# Patient Record
Sex: MALE | URBAN - METROPOLITAN AREA
[De-identification: names, ages, dates, MRNs, and addresses within clinical notes are randomized per-mention and may not be internally consistent; named-entity substitution may affect disease eponyms.]

---

## 2017-10-09 ENCOUNTER — NEW REFERRAL (OUTPATIENT)
Dept: URBAN - METROPOLITAN AREA CLINIC 35 | Facility: CLINIC | Age: 57
End: 2017-10-09

## 2017-10-09 DIAGNOSIS — H35.3132: ICD-10-CM

## 2017-10-09 DIAGNOSIS — H44.23: ICD-10-CM

## 2017-10-09 DIAGNOSIS — H43.393: ICD-10-CM

## 2017-10-09 PROCEDURE — 92225 OPHTHALMOSCOPY (INITIAL): CPT

## 2017-10-09 PROCEDURE — 92134 CPTRZ OPH DX IMG PST SGM RTA: CPT

## 2017-10-09 PROCEDURE — 92250 FUNDUS PHOTOGRAPHY W/I&R: CPT

## 2017-10-09 PROCEDURE — 99244 OFF/OP CNSLTJ NEW/EST MOD 40: CPT

## 2017-10-09 ASSESSMENT — TONOMETRY
OD_IOP_MMHG: 20
OS_IOP_MMHG: 19

## 2017-10-09 ASSESSMENT — VISUAL ACUITY
OS_CC: 20/20-2
OD_PH: 20/25
OD_CC: 20/40

## 2024-09-09 ENCOUNTER — TELEPHONE (OUTPATIENT)
Age: 64
End: 2024-09-09

## 2024-09-09 ENCOUNTER — HOSPITAL ENCOUNTER (OUTPATIENT)
Facility: HOSPITAL | Age: 64
Setting detail: OBSERVATION
Discharge: HOME | End: 2024-09-10
Attending: EMERGENCY MEDICINE | Admitting: INTERNAL MEDICINE
Payer: COMMERCIAL

## 2024-09-09 ENCOUNTER — APPOINTMENT (EMERGENCY)
Dept: CT IMAGING | Facility: HOSPITAL | Age: 64
End: 2024-09-09
Payer: COMMERCIAL

## 2024-09-09 ENCOUNTER — APPOINTMENT (EMERGENCY)
Dept: RADIOLOGY | Facility: HOSPITAL | Age: 64
End: 2024-09-09
Payer: COMMERCIAL

## 2024-09-09 DIAGNOSIS — R56.9 SEIZURE-LIKE ACTIVITY (HCC): ICD-10-CM

## 2024-09-09 DIAGNOSIS — R56.9 SEIZURE (HCC): ICD-10-CM

## 2024-09-09 DIAGNOSIS — R40.4 TRANSIENT ALTERATION OF AWARENESS: Primary | ICD-10-CM

## 2024-09-09 PROBLEM — Z95.0 PRESENCE OF CARDIAC PACEMAKER: Status: ACTIVE | Noted: 2024-08-21

## 2024-09-09 PROBLEM — G47.33 OBSTRUCTIVE SLEEP APNEA SYNDROME: Status: ACTIVE | Noted: 2024-08-08

## 2024-09-09 PROBLEM — I77.810 AORTIC ROOT DILATION (HCC): Status: ACTIVE | Noted: 2024-08-21

## 2024-09-09 PROBLEM — I45.5 SINUS PAUSE: Status: ACTIVE | Noted: 2024-06-25

## 2024-09-09 LAB
2HR DELTA HS TROPONIN: 0 NG/L
ALBUMIN SERPL BCG-MCNC: 3.8 G/DL (ref 3.5–5)
ALP SERPL-CCNC: 53 U/L (ref 34–104)
ALT SERPL W P-5'-P-CCNC: 46 U/L (ref 7–52)
ANION GAP SERPL CALCULATED.3IONS-SCNC: 6 MMOL/L (ref 4–13)
AST SERPL W P-5'-P-CCNC: 27 U/L (ref 13–39)
ATRIAL RATE: 81 BPM
BASOPHILS # BLD AUTO: 0.03 THOUSANDS/ÂΜL (ref 0–0.1)
BASOPHILS NFR BLD AUTO: 1 % (ref 0–1)
BILIRUB SERPL-MCNC: 0.96 MG/DL (ref 0.2–1)
BUN SERPL-MCNC: 13 MG/DL (ref 5–25)
CALCIUM SERPL-MCNC: 8.4 MG/DL (ref 8.4–10.2)
CARDIAC TROPONIN I PNL SERPL HS: 11 NG/L
CARDIAC TROPONIN I PNL SERPL HS: 11 NG/L
CHLORIDE SERPL-SCNC: 108 MMOL/L (ref 96–108)
CO2 SERPL-SCNC: 24 MMOL/L (ref 21–32)
CREAT SERPL-MCNC: 0.8 MG/DL (ref 0.6–1.3)
EOSINOPHIL # BLD AUTO: 0.15 THOUSAND/ÂΜL (ref 0–0.61)
EOSINOPHIL NFR BLD AUTO: 2 % (ref 0–6)
ERYTHROCYTE [DISTWIDTH] IN BLOOD BY AUTOMATED COUNT: 13.2 % (ref 11.6–15.1)
GFR SERPL CREATININE-BSD FRML MDRD: 94 ML/MIN/1.73SQ M
GLUCOSE SERPL-MCNC: 97 MG/DL (ref 65–140)
HCT VFR BLD AUTO: 39.9 % (ref 36.5–49.3)
HGB BLD-MCNC: 13.1 G/DL (ref 12–17)
IMM GRANULOCYTES # BLD AUTO: 0.05 THOUSAND/UL (ref 0–0.2)
IMM GRANULOCYTES NFR BLD AUTO: 1 % (ref 0–2)
LACTATE SERPL-SCNC: 0.8 MMOL/L (ref 0.5–2)
LYMPHOCYTES # BLD AUTO: 0.73 THOUSANDS/ÂΜL (ref 0.6–4.47)
LYMPHOCYTES NFR BLD AUTO: 11 % (ref 14–44)
MCH RBC QN AUTO: 30.7 PG (ref 26.8–34.3)
MCHC RBC AUTO-ENTMCNC: 32.8 G/DL (ref 31.4–37.4)
MCV RBC AUTO: 93 FL (ref 82–98)
MONOCYTES # BLD AUTO: 0.66 THOUSAND/ÂΜL (ref 0.17–1.22)
MONOCYTES NFR BLD AUTO: 10 % (ref 4–12)
NEUTROPHILS # BLD AUTO: 4.98 THOUSANDS/ÂΜL (ref 1.85–7.62)
NEUTS SEG NFR BLD AUTO: 75 % (ref 43–75)
NRBC BLD AUTO-RTO: 0 /100 WBCS
P AXIS: 40 DEGREES
PLATELET # BLD AUTO: 270 THOUSANDS/UL (ref 149–390)
PMV BLD AUTO: 10.7 FL (ref 8.9–12.7)
POTASSIUM SERPL-SCNC: 3.7 MMOL/L (ref 3.5–5.3)
PR INTERVAL: 172 MS
PROT SERPL-MCNC: 6.1 G/DL (ref 6.4–8.4)
QRS AXIS: -11 DEGREES
QRSD INTERVAL: 94 MS
QT INTERVAL: 396 MS
QTC INTERVAL: 460 MS
RBC # BLD AUTO: 4.27 MILLION/UL (ref 3.88–5.62)
SODIUM SERPL-SCNC: 138 MMOL/L (ref 135–147)
T WAVE AXIS: 23 DEGREES
VENTRICULAR RATE: 81 BPM
WBC # BLD AUTO: 6.6 THOUSAND/UL (ref 4.31–10.16)

## 2024-09-09 PROCEDURE — 99285 EMERGENCY DEPT VISIT HI MDM: CPT | Performed by: EMERGENCY MEDICINE

## 2024-09-09 PROCEDURE — 93005 ELECTROCARDIOGRAM TRACING: CPT

## 2024-09-09 PROCEDURE — 99223 1ST HOSP IP/OBS HIGH 75: CPT | Performed by: INTERNAL MEDICINE

## 2024-09-09 PROCEDURE — 84484 ASSAY OF TROPONIN QUANT: CPT | Performed by: EMERGENCY MEDICINE

## 2024-09-09 PROCEDURE — 99215 OFFICE O/P EST HI 40 MIN: CPT | Performed by: PSYCHIATRY & NEUROLOGY

## 2024-09-09 PROCEDURE — 83605 ASSAY OF LACTIC ACID: CPT | Performed by: EMERGENCY MEDICINE

## 2024-09-09 PROCEDURE — 36415 COLL VENOUS BLD VENIPUNCTURE: CPT | Performed by: EMERGENCY MEDICINE

## 2024-09-09 PROCEDURE — 71045 X-RAY EXAM CHEST 1 VIEW: CPT

## 2024-09-09 PROCEDURE — 85025 COMPLETE CBC W/AUTO DIFF WBC: CPT | Performed by: EMERGENCY MEDICINE

## 2024-09-09 PROCEDURE — 96374 THER/PROPH/DIAG INJ IV PUSH: CPT

## 2024-09-09 PROCEDURE — 99285 EMERGENCY DEPT VISIT HI MDM: CPT

## 2024-09-09 PROCEDURE — 70450 CT HEAD/BRAIN W/O DYE: CPT

## 2024-09-09 PROCEDURE — 93010 ELECTROCARDIOGRAM REPORT: CPT | Performed by: INTERNAL MEDICINE

## 2024-09-09 PROCEDURE — 80053 COMPREHEN METABOLIC PANEL: CPT | Performed by: EMERGENCY MEDICINE

## 2024-09-09 RX ORDER — OMEPRAZOLE 40 MG/1
40 CAPSULE, DELAYED RELEASE ORAL DAILY
COMMUNITY
Start: 2024-06-06

## 2024-09-09 RX ORDER — PANTOPRAZOLE SODIUM 40 MG/1
40 TABLET, DELAYED RELEASE ORAL
Status: DISCONTINUED | OUTPATIENT
Start: 2024-09-09 | End: 2024-09-10 | Stop reason: HOSPADM

## 2024-09-09 RX ORDER — LORATADINE 10 MG/1
10 TABLET ORAL DAILY
COMMUNITY
Start: 2024-08-29 | End: 2025-08-29

## 2024-09-09 RX ORDER — MORPHINE SULFATE 4 MG/ML
4 INJECTION, SOLUTION INTRAMUSCULAR; INTRAVENOUS ONCE
Status: COMPLETED | OUTPATIENT
Start: 2024-09-09 | End: 2024-09-09

## 2024-09-09 RX ORDER — ACETAMINOPHEN 325 MG/1
650 TABLET ORAL EVERY 6 HOURS PRN
Status: DISCONTINUED | OUTPATIENT
Start: 2024-09-09 | End: 2024-09-10 | Stop reason: HOSPADM

## 2024-09-09 RX ORDER — LEVETIRACETAM 500 MG/5ML
2500 INJECTION, SOLUTION, CONCENTRATE INTRAVENOUS ONCE
Status: COMPLETED | OUTPATIENT
Start: 2024-09-09 | End: 2024-09-09

## 2024-09-09 RX ORDER — LORATADINE 10 MG/1
10 TABLET ORAL DAILY
Status: DISCONTINUED | OUTPATIENT
Start: 2024-09-09 | End: 2024-09-10 | Stop reason: HOSPADM

## 2024-09-09 RX ADMIN — PANTOPRAZOLE SODIUM 40 MG: 40 TABLET, DELAYED RELEASE ORAL at 12:28

## 2024-09-09 RX ADMIN — LEVETIRACETAM 2500 MG: 100 INJECTION, SOLUTION INTRAVENOUS at 11:02

## 2024-09-09 RX ADMIN — LORATADINE 10 MG: 10 TABLET ORAL at 12:28

## 2024-09-09 RX ADMIN — LEVETIRACETAM 750 MG: 250 TABLET, FILM COATED ORAL at 22:29

## 2024-09-09 RX ADMIN — MORPHINE SULFATE 4 MG: 4 INJECTION, SOLUTION INTRAMUSCULAR; INTRAVENOUS at 05:20

## 2024-09-09 NOTE — ED PROVIDER NOTES
History  Chief Complaint   Patient presents with    Seizure - Prior Hx Of     Ems states that pt has been at [facility] twice for this seizure like activity. Pt and family wanted to go to a different hospital for a second opinion. Pt was suppose to follow-up with a neurologist but hasn't yet. Pt states that he went to bed last night and wife woke up and felt like pt wasn't breathing. EMS stated that pt was postictal from possible seizure like activity. Pt states that he had his first one 7weeks ago, 1 week ago and not this morning      Zain Amador is a 64 y.o. year old male with PMH of severe FRANCES, syncope, sinus arrest s/p PPM placement presenting to the [facility] ED for seizure-like activity. Patient reported to be in normal state of health yesterday prior to going to bed. He states he got up to go to the bathroom and then return to bed and replaced his CPAP device. Patient then regained consciousness with multiple people around him. Per wife the patient was gasping for air at 0245 this morning. Wife removed th cpap mask and noted patient was drooling and staring straight ahead. At that time the patient was unresponsive and she called EMS. Wife was instructed by 911 dispatch to perform CPR which she did until EMS arrived. At time of EMS arrival patient regained consciousness. There was no reported bowel/bladder incontinence or tongue laceration. Wife did not observe shaking or seizure-like activity. POC glucose 98 mg/dl per EMS. Patient alert/oriented and acting appropriately per EMS during transport to ED. He is denying any complaints at time of evaluation in ED.    Patient has history of prior episodes of unresponsiveness for which she was hospitalized at [facility].  Patient was found to have sinus pauses and had a Medtronic pacemaker placed on 6/27/2024.  The patient had another episode of unresponsiveness and sleep for which he was again hospitalized. Per review of records he underwent MRI of the brain  and EEG testing and symptoms were ultimately felt to be related to sleep apnea. He has not had neurology followup since discharge from hospital.      History provided by:  Medical records, patient, EMS personnel and spouse   used: No        None       Past Medical History:   Diagnosis Date    Aneurysm of ascending aorta without rupture (HCC)     GERD (gastroesophageal reflux disease)        Past Surgical History:   Procedure Laterality Date    CARDIAC PACEMAKER PLACEMENT         No family history on file.  I have reviewed and agree with the history as documented.    E-Cigarette/Vaping     E-Cigarette/Vaping Substances          Review of Systems   Constitutional:  Negative for chills and fever.   HENT:  Negative for congestion.    Respiratory:  Negative for shortness of breath.    Cardiovascular:  Negative for chest pain.   Gastrointestinal:  Negative for abdominal pain, nausea and vomiting.   Genitourinary:  Negative for dysuria and flank pain.   Musculoskeletal:  Negative for back pain and neck pain.   Neurological:  Positive for syncope. Negative for dizziness, seizures, facial asymmetry and weakness.   All other systems reviewed and are negative.      Physical Exam  Physical Exam  Vitals and nursing note reviewed.   Constitutional:       General: He is not in acute distress.     Appearance: Normal appearance. He is well-developed. He is not ill-appearing, toxic-appearing or diaphoretic.   HENT:      Head: Normocephalic and atraumatic.      Nose: No congestion or rhinorrhea.   Eyes:      General:         Right eye: No discharge.         Left eye: No discharge.   Cardiovascular:      Rate and Rhythm: Normal rate and regular rhythm.      Heart sounds: No murmur heard.  Pulmonary:      Effort: Pulmonary effort is normal. No respiratory distress.      Breath sounds: Normal breath sounds. No wheezing or rales.   Abdominal:      Palpations: Abdomen is soft.      Tenderness: There is no abdominal  tenderness. There is no guarding or rebound.   Skin:     General: Skin is warm.      Capillary Refill: Capillary refill takes less than 2 seconds.   Neurological:      Mental Status: He is alert and oriented to person, place, and time.      GCS: GCS eye subscore is 4. GCS verbal subscore is 5. GCS motor subscore is 6.      Cranial Nerves: No dysarthria or facial asymmetry.      Sensory: Sensation is intact.      Motor: Motor function is intact.      Comments: Strength +5/5 in bilateral UE/LE.  No vertical nystagmus noted.  CN III, IV and VI intact.  Cerebellar testing: Normal FNF without ataxia.  No pronator drift noted.       Psychiatric:         Mood and Affect: Mood and affect and mood normal.         Behavior: Behavior normal.         Vital Signs  ED Triage Vitals   Temperature Pulse Respirations Blood Pressure SpO2   09/09/24 0408 09/09/24 0408 09/09/24 0408 09/09/24 0408 09/09/24 0408   98 °F (36.7 °C) 80 18 135/72 97 %      Temp Source Heart Rate Source Patient Position - Orthostatic VS BP Location FiO2 (%)   09/09/24 0408 -- -- -- --   Temporal          Pain Score       09/09/24 0622       No Pain           Vitals:    09/09/24 0408 09/09/24 0622   BP: 135/72 115/58   Pulse: 80 65         Visual Acuity      ED Medications  Medications   morphine injection 4 mg (4 mg Intravenous Given 9/9/24 0520)       Diagnostic Studies  Results Reviewed       Procedure Component Value Units Date/Time    Lactic acid, plasma (w/reflex if result > 2.0) [[id number]] Collected: 09/09/24 0622    Lab Status: In process Specimen: Blood from Arm, Right Updated: 09/09/24 0624    HS Troponin I 2hr [[id number]] Collected: 09/09/24 0614    Lab Status: In process Specimen: Blood from Arm, Right Updated: 09/09/24 0619    HS Troponin I 4hr [[id number]]     Lab Status: No result Specimen: Blood     Comprehensive metabolic panel [[id number]]  (Abnormal) Collected: 09/09/24 0523    Lab Status: Final result Specimen: Blood from Arm, Right Updated:  09/09/24 0558     Sodium 138 mmol/L      Potassium 3.7 mmol/L      Chloride 108 mmol/L      CO2 24 mmol/L      ANION GAP 6 mmol/L      BUN 13 mg/dL      Creatinine 0.80 mg/dL      Glucose 97 mg/dL      Calcium 8.4 mg/dL      AST 27 U/L      ALT 46 U/L      Alkaline Phosphatase 53 U/L      Total Protein 6.1 g/dL      Albumin 3.8 g/dL      Total Bilirubin 0.96 mg/dL      eGFR 94 ml/min/1.73sq m     Narrative:      National Kid[employer] Disease Foundation guidelines for Chronic Kidney Disease (CKD):     Stage 1 with normal or high GFR (GFR > 90 mL/min/1.73 square meters)    Stage 2 Mild CKD (GFR = 60-89 mL/min/1.73 square meters)    Stage 3A Moderate CKD (GFR = 45-59 mL/min/1.73 square meters)    Stage 3B Moderate CKD (GFR = 30-44 mL/min/1.73 square meters)    Stage 4 Severe CKD (GFR = 15-29 mL/min/1.73 square meters)    Stage 5 End Stage CKD (GFR <15 mL/min/1.73 square meters)  Note: GFR calculation is accurate only with a steady state creatinine    CBC and differential [[id number]]  (Abnormal) Collected: 09/09/24 0512    Lab Status: Final result Specimen: Blood from Arm, Right Updated: 09/09/24 0541     WBC 6.60 Thousand/uL      RBC 4.27 Million/uL      Hemoglobin 13.1 g/dL      Hematocrit 39.9 %      MCV 93 fL      MCH 30.7 pg      MCHC 32.8 g/dL      RDW 13.2 %      MPV 10.7 fL      Platelets 270 Thousands/uL      nRBC 0 /100 WBCs      Segmented % 75 %      Immature Grans % 1 %      Lymphocytes % 11 %      Monocytes % 10 %      Eosinophils Relative 2 %      Basophils Relative 1 %      Absolute Neutrophils 4.98 Thousands/µL      Absolute Immature Grans 0.05 Thousand/uL      Absolute Lymphocytes 0.73 Thousands/µL      Absolute Monocytes 0.66 Thousand/µL      Eosinophils Absolute 0.15 Thousand/µL      Basophils Absolute 0.03 Thousands/µL     HS Troponin 0hr (reflex protocol) [[id number]]  (Normal) Collected: 09/09/24 0419    Lab Status: Final result Specimen: Blood from Arm, Right Updated: 09/09/24 0455     hs TnI 0hr 11 ng/L                     CT head without contrast   Final Result by Cale Walker MD (09/09 0540)      No acute intracranial abnormality.                  Workstation performed: [id number]         XR chest portable   ED Interpretation by Adolfo Polanco DO (09/09 0435)   Pacemaker in the left chest. No pneumothorax. No focal infiltrate. No cardiomegaly. No acute cardiopulmonary disease.                 Procedures  Procedures         ED Course  ED Course as of 09/09/24 0635   Mon Sep 09, 2024   0420 Procedure Note: EKG  Date/Time: 09/09/24 4:20 AM   Interpreted by: Adolfo Polanco DO  Indications / Diagnosis: possible seizure  ECG reviewed by me, the ED Provider: yes   The EKG demonstrates:  Rhythm: normal sinus rhythm 81 BPM  Intervals: Normal CT and QT intervals  Axis: Normal axis  QRS/Blocks: Normal QRS  ST Changes: No acute ST/T waves changes. No ANAT. No TWI.                                               Medical Decision Making    64 y.o. male presenting for episode of unresponsiveness.  Vital stable, patient without complaint on initial evaluation.  [facility] given history of similar episodes in the past.  Prior MRI of the brain and EEG without acute pathology.    Will order labs to evaluate for anemia, lecture abnormality, ASHU, ACS, arrhythmia.  Will obtain for Medtronic pacemaker interrogation to screen for arrhythmia.  Will order chest x-ray as patient is at risk for rib fractures or pneumothorax.  Will obtain CT head to exclude intracranial mass or hemorrhage.  Differential diagnosis would include nonconvulsive seizure.    Repeat evaluation: Vital stable during ED course.  Patient asymptomatic.  Reviewed lab results with the patient and his wife at bedside.  Medtronic pacemaker report reviewed, no evidence of arrhythmia this evening to explain symptoms.  Wife shows a video of the episode of unresponsiveness and could be related to seizure episode.    Impression: Transient episode of  unresponsiveness of unclear etiology. Will admit for further evaluation and management.  Patient care discussed with SLIM who will assume care and admit patient to the hospital. I have discussed with the patient our recommendation of inpatient admission for further medical care. I have answered all of the patient's questions and concerns. The patient is in agreement with the plan to proceed with admission to the hospital.     Amount and/or Complexity of Data Reviewed  Labs: ordered.  Radiology: ordered and independent interpretation performed.    Risk  Prescription drug management.  Decision regarding hospitalization.                 Disposition  Final diagnoses:   Transient alteration of awareness     Time reflects when diagnosis was documented in both MDM as applicable and the Disposition within this note       Time User Action Codes Description Comment    9/9/2024  6:28 AM Adolfo Polanco Add [R40.4] Transient alteration of awareness           ED Disposition       ED Disposition   Admit    Condition   Stable    Date/Time   Mon Sep 9, 2024  6:28 AM    Comment   Case was discussed with GIRMA and the patient's admission status was agreed to be Admission Status: observation status to the service of Dr. Martinez.               Follow-up Information    None         Patient's Medications    No medications on file       No discharge procedures on file.    PDMP Review       None            ED Provider  Electronically Signed by             Adolfo Polanco DO  09/10/24 [id number]

## 2024-09-09 NOTE — TELEPHONE ENCOUNTER
STILL ADMITTED:9/9/2024 - present  [facility]      HFU/ [facility]/ Seizure     DC-     ----- Message from Liz Barreto PA-C sent at 9/9/2024  1:10 PM EDT -----  Regarding: Hospital Follow-Up  Zain Amador will need follow-up in in 6 weeks with epilepsy team for Other in 60 minute appointment. They will not require outpatient neurological testing.
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
## 2024-09-09 NOTE — APP STUDENT NOTE
"  ROB STUDENT  Inpatient H&P Exam for TRAINING ONLY  Not Part of Legal Medical Record       H&P Exam - Zain Amador 64 y.o. male MRN: [id number]  Unit/Bed#: -Kush Encounter: [id number]    Service: Internal Medicine     Assessment & Plan  Seizure-like activity  - Patient has mad multiple episodes of seizure-like activity requiring hospitalization occurring June 25th, August 21st, and now this visit. The patient went to [facility] for a full work up and received EEG and head CT within normal limits. Each episode involves \"starting off\", unresponsiveness, and drooling from the mouth. Patient's wife denied involuntary jerking movements.  - Symptoms could be due to improper CPAP machine settings causing symptoms since episodes of seizure activity have only occurred at night time  - CT head and CXR obtained on admission which are both unremarkable  - Neurology consult obtained and recommended against MRI and EEG at this time. Keppra therapy started  - Monitor telemetry  - Discussed seizure precautions: no driving, heavy lifting, climbing heights, swimming unattended, hot tub baths  2. Transient alteration of awareness   - Patient was unable to orient himself until after 15 minutes passed from onset of   symptoms per patient's wife. Was alert and oriented by the time he arrived in the ER   - Continue to monitor change in symptoms  3. Obstructive Sleep Apnea  - Monitor change in symptoms and vitals at night time, could be contributory to seizure like activity   - CPAP machine ordered   - Patient counseled to follow up with pulmonology on discharge to ensure at home  machine is working properly  4. Presence of cardiac pacemaker   - Continue to monitor telemetry for changes in heart rate and rhythm  5. Sinus pause   - Continue to monitor  6. Aortic root dilation   - Continue to monitor for change in symptoms    VTE Prophylaxis:  Encourage ambulation    Code Status: Level 1-Full Code: all life saving measures are indicated " "  POLST: {POLST on Admission:85890}  Discussion with family: Patient and wife at bedside    Anticipated Length of Stay:  Patient will be admitted on an Observation basis with an anticipated length of stay of 2 midnights.   Justification for Hospital Stay: seizure like activity    Total Time for Visit, including Counseling / Coordination of Care: 1 hour.  Greater than 50% of this total time spent on direct patient counseling and coordination of care.    Chief Complaint:   Per wife \"he wasn't responding and drooling\"    History of Present Illness:    Zain Amador is a 64 y.o. male with history of FRANCES, sinus pause, cardiac pacemaker, and aortic root dilation presents with seizure like activity including non-responsiveness and drooling form the mouth. The patient does not remember this episode and history is given by the patient's wife. The patient's wife stated this first happened in June 25th, then August 21st. Each episode occurs in the middle of the night and wife finds patient staring off, drooling out of mouth, and non responsive. The patient did not stop breathing and CPR was performed on the patient. EMTs arrived at the patient's house and brought him to the ER.The patient has been worked up for this issue before including cardiology and pulmonology visits in the outpatient setting. The patient has seen neurologist during other hospital visits. The patient is alert and oriented today x 3. The patient denied chest pain or SOB.    Review of Systems:    Review of Systems   Constitutional:  Negative for chills, fatigue and fever.   HENT:  Positive for drooling. Negative for postnasal drip and sore throat.    Respiratory:  Negative for cough, chest tightness and shortness of breath.    Cardiovascular:  Negative for chest pain, palpitations and leg swelling.   Gastrointestinal:  Negative for constipation, diarrhea and nausea.   Endocrine: Negative for polydipsia.   Genitourinary:  Negative for difficulty urinating and " "frequency.   Neurological:  Positive for seizures. Negative for light-headedness and headaches.   Psychiatric/Behavioral:  Positive for confusion.        Past Medical and Surgical History:     Past Medical History:   Diagnosis Date    Aneurysm of ascending aorta without rupture (HCC)     GERD (gastroesophageal reflux disease)        Past Surgical History:   Procedure Laterality Date    CARDIAC PACEMAKER PLACEMENT         Meds/Allergies:    Prior to Admission medications    Medication Sig Start Date End Date Taking? Authorizing Provider   loratadine (CLARITIN) 10 mg tablet Take 10 mg by mouth daily 8/29/24 8/29/25 Yes Historical Provider, MD   omeprazole (PriLOSEC) 40 MG capsule Take 40 mg by mouth daily 6/6/24  Yes Historical Provider, MD     I have reviewed home medications with patient personally.    Allergies: No Known Allergies    Social History:     Marital Status: /Civil Union   Occupation: /retired  Patient Pre-hospital Living Situation: At home with wife  Patient Pre-hospital Level of Mobility: Can walk and move on his own  Patient Pre-hospital Diet Restrictions: No restrictions  Substance Use History:   Social History     Substance and Sexual Activity   Alcohol Use Not Currently     Social History     Tobacco Use   Smoking Status Never   Smokeless Tobacco Never     Social History     Substance and Sexual Activity   Drug Use Not on file       Family History:    non-contributory    Physical Exam:     Vitals:   Blood Pressure: 116/73 (09/09/24 0746)  Pulse: 66 (09/09/24 0746)  Temperature: 98.6 °F (37 °C) (09/09/24 0746)  Temp Source: Oral (09/09/24 0746)  Respirations: 18 (09/09/24 0746)  Height: 5' 9\" (175.3 cm) (09/09/24 0746)  Weight - Scale: 102 kg (224 lb) (09/09/24 0408)  SpO2: 95 % (09/09/24 0746)    Physical Exam  Constitutional:       General: He is not in acute distress.     Appearance: Normal appearance. He is not diaphoretic.   HENT:      Head: Normocephalic and atraumatic.      " Nose: Nose normal.      Mouth/Throat:      Mouth: Mucous membranes are moist.      Pharynx: Oropharynx is clear.   Eyes:      Extraocular Movements: Extraocular movements intact.      Pupils: Pupils are equal, round, and reactive to light.   Cardiovascular:      Rate and Rhythm: Normal rate and regular rhythm.      Pulses: Normal pulses.      Heart sounds: Normal heart sounds.   Pulmonary:      Effort: Pulmonary effort is normal. No respiratory distress.      Breath sounds: Normal breath sounds.   Chest:      Chest wall: No tenderness.   Abdominal:      Palpations: Abdomen is soft.      Tenderness: There is no abdominal tenderness. There is no guarding.   Musculoskeletal:      Cervical back: Normal range of motion. No rigidity or tenderness.   Skin:     General: Skin is warm.      Findings: No bruising, erythema, lesion or rash.   Neurological:      General: No focal deficit present.      Mental Status: He is alert and oriented to person, place, and time. Mental status is at baseline.      Cranial Nerves: No cranial nerve deficit.      Sensory: No sensory deficit.      Motor: No weakness.   Psychiatric:         Mood and Affect: Mood normal.         Behavior: Behavior normal.         Thought Content: Thought content normal.         Judgment: Judgment normal.           Additional Data:     Lab Results: I have personally reviewed pertinent reports.      Results from last 7 days   Lab Units 09/09/24  0512   WBC Thousand/uL 6.60   HEMOGLOBIN g/dL 13.1   HEMATOCRIT % 39.9   PLATELETS Thousands/uL 270   SEGS PCT % 75   LYMPHO PCT % 11*   MONO PCT % 10   EOS PCT % 2     Results from last 7 days   Lab Units 09/09/24  0523   SODIUM mmol/L 138   POTASSIUM mmol/L 3.7   CHLORIDE mmol/L 108   CO2 mmol/L 24   BUN mg/dL 13   CREATININE mg/dL 0.80   ANION GAP mmol/L 6   CALCIUM mg/dL 8.4   ALBUMIN g/dL 3.8   TOTAL BILIRUBIN mg/dL 0.96   ALK PHOS U/L 53   ALT U/L 46   AST U/L 27   GLUCOSE RANDOM mg/dL 97                 Results from  last 7 days   Lab Units 09/09/24  0622   LACTIC ACID mmol/L 0.8       Imaging: I have personally reviewed pertinent reports.      CT head without contrast   Final Result by Cale Walker MD (09/09 0540)      No acute intracranial abnormality.                  Workstation performed: [id number]         XR chest portable   ED Interpretation by Adolfo Polanco DO (09/09 0435)   Pacemaker in the left chest. No pneumothorax. No focal infiltrate. No cardiomegaly. No acute cardiopulmonary disease.          EKG, Pathology, and Other Studies Reviewed on Admission:   EKG: In process  CT head w/o contrast: No acute intracranial abnormality  XR chest portable: No acute cardiopulmonary disease    Allscripts / Epic Records Reviewed: Yes     ** Please Note: This note has been constructed using a voice recognition system. **     Today, Patient Was Seen By: Jose COSTA

## 2024-09-09 NOTE — ASSESSMENT & PLAN NOTE
"Zain Amador is a 64 y.o. male with prior LOC/seizure like events, sinus pause s/p PPM, FRANCES on CPAP [facility] after an episode of loss of consciousness.    Workup:  - MRI baldemar gorman wo 6/25/2024 completed at [facility]:  \"1.  No acute intracranial findings: No acute infarct or intracranial hemorrhage.   No pathologic enhancement identified.   2.  Single punctate focus of nonenhancing T2/FLAIR hyperintensity within the   left frontal periventricular white matter, which is ultimately nonspecific and   of unknown clinical significance.\"  - Routine EEG 6/25/2024 completed at [facility]:  \"Noted excess muscle and EMG artifact. Otherwise, this electroencephalogram   is within normal limits in the awake and asleep state(s), as well as during photic stimulation\"  - Routine EEG 8/24/2024 completed at [facility]:  \"No focal or epileptiform abnormalities noted on this recording.\"  - CT head: Unremarkable for acute intracranial abnormalities     Multiple stereotyped event described as abnormal breathing with CPAP machine on, unresponsive, with reported confusion following the events, now presenting with third event. Concern for seizures, will plan to start AED.     Plan:  - Would not recommend routine EEG at this time  - Hold off on obtaining MRI brain at this time  - Plan to load with Keppra 2500 mg x 1 and start maintenance Keppra 750 mg Q12H starting tonight, plan to continue on discharge   - Telemetry   - Discussed seizure precautions:  No driving, lifting heavy machinery, climbing heights, swimming unattended, hot tub baths or any other activity that will place you in danger in case of a seizure for at least six months.  - [employer] form completed and submitted online on 9/9/2024  - Medical management and supportie care per primary team, notify with changes   "

## 2024-09-09 NOTE — PLAN OF CARE
Problem: PAIN - ADULT  Goal: Verbalizes/displays adequate comfort level or baseline comfort level  Description: Interventions:  - Encourage patient to monitor pain and request assistance  - Assess pain using appropriate pain scale  - Administer analgesics based on type and severity of pain and evaluate response  - Implement non-pharmacological measures as appropriate and evaluate response  - Consider cultural and social influences on pain and pain management  - Notify physician/advanced practitioner if interventions unsuccessful or patient reports new pain  Outcome: Progressing     Problem: INFECTION - ADULT  Goal: Absence or prevention of progression during hospitalization  Description: INTERVENTIONS:  - Assess and monitor for signs and symptoms of infection  - Monitor lab/diagnostic results  - Monitor all insertion sites, i.e. indwelling lines, tubes, and drains  - Monitor endotracheal if appropriate and nasal secretions for changes in amount and color  - [place]idan appropriate cooling/warming therapies per order  - Administer medications as ordered  - Instruct and encourage patient and family to use good hand hygiene technique  - Identify and instruct in appropriate isolation precautions for identified infection/condition  Outcome: Progressing  Goal: Absence of fever/infection during neutropenic period  Description: INTERVENTIONS:  - Monitor WBC    Outcome: Progressing

## 2024-09-09 NOTE — H&P
[facility]  H&P  Name: Zain Amador 64 y.o. male I MRN: [id number]  Unit/Bed#: -01 I Date of Admission: 9/9/2024   Date of Service: 9/9/2024 I Hospital Day: 0      Assessment & Plan   * Seizure (HCC)  Assessment & Plan  Patient presented with episode of unresponsiveness during the night.  Per patient and wife, he went to bed wearing his CPAP and woke up with 6 people from the EMS around him.  Per wife he was unresponsive and flaccid with abnormal respirations, no shaking activity.  Patient had postictal confusion  Patient had a few similar episodes in the recent past, that occurred during the night only.  After the first episode it was thought patient might have had a pulseless activity and had a pacemaker implanted.  Had significant workup for the seizure at [facility] recently including MRI brain that was negative and EEG.  Patient had pacemaker interrogation that was unremarkable also pulmonology consulted for FRANCES.  Neurology was consulted.  Per neurology excluded to have cardiac origin and unlikely FRANCES, there is a concern for seizure.  Patient was loaded with Keppra and continue scheduled Keppra afterwards.  No need for repeat EEG while inpatient or brain MRI.  Continue seizure precaution, telemetry  Likely discharge 24 hours  Will need to follow-up with neurology after discharge        Presence of cardiac pacemaker  Assessment & Plan  Noted    Obstructive sleep apnea syndrome  Assessment & Plan  Continue CPAP at bedtime  Patient can use his own CPAP from home         VTE Pharmacologic Prophylaxis: VTE Score: 2 Low Risk (Score 0-2) - Encourage Ambulation.  Code Status: Level 1 - Full Code   Discussion with family: Patient declined call to .     Anticipated Length of Stay: Patient will be admitted on an observation basis with an anticipated length of stay of less than 2 midnights secondary to seizure-like activity.    Total Time Spent on Date of Encounter in care of  patient: 75 mins. This time was spent on one or more of the following: performing physical exam; counseling and coordination of care; obtaining or reviewing history; documenting in the medical record; reviewing/ordering tests, medications or procedures; communicating with other healthcare professionals and discussing with patient's family/caregivers.    Chief Complaint: Unresponsive    History of Present Illness:  Zain Amador is a 64 y.o. male with a PMH of sinus pauses status post PPM, severe FRANCES on CPAP, prior seizure-like activity, GERD who presents with episode of loss of consciousness.  History obtained from patient and chart review.    Chart review patient had seizure-like activity on 6/25/24 during the nighttime, was found to have a 15-second sinus pause on telemetry and PPM was placed during that admission.  At that time MRI brain and EEG were unremarkable.  Had a recent admission at [facility] in August for similar seizure-like event.  PPM was interrogated and unremarkable for arrhythmias  Patient was not started on antiseizure medication, it was stopped he might have had a seizure because of FRANCES    Patient had another episode of seizure-like activity this morning around 3:00.  Patient was noted to have trouble breathing and wife attempted to wake him up however he was not responding.  Is also noted to be drooling, no shaking activity.  EMS was called.  Patient was confused for approximately 15 minutes.  Patient reported he remembered when he went to bed and when he woke up he was surrounded by EMS staff.      Was admitted for further management.    Review of Systems:  Review of Systems   Constitutional:  Negative for chills and fever.   Respiratory:  Negative for cough and shortness of breath.    Cardiovascular:  Negative for chest pain.   Gastrointestinal:  Negative for abdominal pain, diarrhea, nausea and vomiting.   Genitourinary:  Negative for difficulty urinating and dysuria.   Neurological:  Negative for  "light-headedness.   All other systems reviewed and are negative.      Past Medical and Surgical History:   Past Medical History:   Diagnosis Date    Aneurysm of ascending aorta without rupture (HCC)     GERD (gastroesophageal reflux disease)        Past Surgical History:   Procedure Laterality Date    CARDIAC PACEMAKER PLACEMENT         Meds/Allergies:  Prior to Admission medications    Medication Sig Start Date End Date Taking? Authorizing Provider   loratadine (CLARITIN) 10 mg tablet Take 10 mg by mouth daily 8/29/24 8/29/25 Yes Historical Provider, MD   omeprazole (PriLOSEC) 40 MG capsule Take 40 mg by mouth daily 6/6/24  Yes Historical Provider, MD     I have reviewed home medications with patient personally.    Allergies: No Known Allergies    Social History:  Marital Status: /Civil Union   Occupation:   Patient Pre-hospital Living Situation: Home  Patient Pre-hospital Level of Mobility: walks  Patient Pre-hospital Diet Restrictions: none  Substance Use History:   Social History     Substance and Sexual Activity   Alcohol Use Not Currently     Social History     Tobacco Use   Smoking Status Never   Smokeless Tobacco Never     Social History     Substance and Sexual Activity   Drug Use Not on file       Family History:  History reviewed. No pertinent family history.    Physical Exam:     Vitals:   Blood Pressure: 106/69 (09/09/24 1621)  Pulse: 67 (09/09/24 1621)  Temperature: 98.2 °F (36.8 °C) (09/09/24 1621)  Temp Source: Oral (09/09/24 0746)  Respirations: 19 (09/09/24 1621)  Height: 5' 9\" (175.3 cm) (09/09/24 0746)  Weight - Scale: 102 kg (224 lb) (09/09/24 0408)  SpO2: 91 % (09/09/24 1621)    Physical Exam  Vitals and nursing note reviewed.   Constitutional:       General: He is not in acute distress.     Appearance: He is not diaphoretic.   HENT:      Head: Normocephalic.   Eyes:      General: No scleral icterus.        Right eye: No discharge.         Left eye: No discharge. " "  Cardiovascular:      Rate and Rhythm: Normal rate and regular rhythm.   Pulmonary:      Effort: Pulmonary effort is normal. No respiratory distress.      Breath sounds: Normal breath sounds. No wheezing, rhonchi or rales.   Abdominal:      General: There is no distension.      Palpations: Abdomen is soft.      Tenderness: There is no abdominal tenderness. There is no guarding or rebound.   Musculoskeletal:      Cervical back: Normal range of motion.      Right lower leg: No edema.      Left lower leg: No edema.   Skin:     General: Skin is warm.   Neurological:      Mental Status: He is alert and oriented to person, place, and time.   Psychiatric:         Mood and Affect: Mood normal.         Behavior: Behavior normal.         Thought Content: Thought content normal.         Judgment: Judgment normal.          Additional Data:     Lab Results:  Results from last 7 days   Lab Units 09/09/24  0512   WBC Thousand/uL 6.60   HEMOGLOBIN g/dL 13.1   HEMATOCRIT % 39.9   PLATELETS Thousands/uL 270   SEGS PCT % 75   LYMPHO PCT % 11*   MONO PCT % 10   EOS PCT % 2     Results from last 7 days   Lab Units 09/09/24  0523   SODIUM mmol/L 138   POTASSIUM mmol/L 3.7   CHLORIDE mmol/L 108   CO2 mmol/L 24   BUN mg/dL 13   CREATININE mg/dL 0.80   ANION GAP mmol/L 6   CALCIUM mg/dL 8.4   ALBUMIN g/dL 3.8   TOTAL BILIRUBIN mg/dL 0.96   ALK PHOS U/L 53   ALT U/L 46   AST U/L 27   GLUCOSE RANDOM mg/dL 97             No results found for: \"HGBA1C\"  Results from last 7 days   Lab Units 09/09/24  0622   LACTIC ACID mmol/L 0.8       Lines/Drains:  Invasive Devices       Peripheral Intravenous Line  Duration             Peripheral IV 09/09/24 Dorsal (posterior);Right Forearm <1 day                        Imaging: Reviewed radiology reports from this admission including: CT head  CT head without contrast   Final Result by Cale Walker MD (09/09 0540)      No acute intracranial abnormality.                  Workstation performed: [id number] "         XR chest portable   ED Interpretation by Adolfo Polanco DO (09/09 1620)   Pacemaker in the left chest. No pneumothorax. No focal infiltrate. No cardiomegaly. No acute cardiopulmonary disease.      Final Result by Tresa Swenson MD (09/09 1103)      No acute cardiopulmonary disease.            Workstation performed: [id number]             EKG and Other Studies Reviewed on Admission:   EKG: NSR. HR 80.    ** Please Note: This note has been constructed using a voice recognition system. **

## 2024-09-09 NOTE — LETTER
[facility] MED SURG UNIT  [address]  [place] [address]  Dept: [phone]    September 10, 2024     Patient: Zain Amador   YOB: 1960   Date of Visit: 9/9/2024       To Whom it May Concern:    Zain Amador is under my professional care. He was seen in the hospital from 9/9/2024 to 09/10/24. He is not allowed to drive, until cleared by neurology.    If you have any questions or concerns, please don't hesitate to call.         Sincerely,          Sobia Martinez MD

## 2024-09-09 NOTE — ASSESSMENT & PLAN NOTE
Patient presented with episode of unresponsiveness during the night.  Per patient and wife, he went to bed wearing his CPAP and woke up with 6 people from the EMS around him.  Per wife he was unresponsive and flaccid with abnormal respirations, no shaking activity.  Patient had postictal confusion  Patient had a few similar episodes in the recent past, that occurred during the night only.  After the first episode it was thought patient might have had a pulseless activity and had a pacemaker implanted.  Had significant workup for the seizure at [facility] recently including MRI brain that was negative and EEG.  Patient had pacemaker interrogation that was unremarkable   Neurology was consulted.  Per neurology excluded to have cardiac origin and unlikely FRANCES, there is a concern for seizure.  Patient was loaded with Keppra and continue scheduled Keppra afterwards.  No need for repeat EEG while inpatient or brain MRI.  Continue seizure precaution, telemetry  Likely discharge 24 hours  Will need to follow-up with neurology after discharge

## 2024-09-10 VITALS
TEMPERATURE: 98.2 F | HEIGHT: 69 IN | OXYGEN SATURATION: 92 % | DIASTOLIC BLOOD PRESSURE: 69 MMHG | RESPIRATION RATE: 19 BRPM | HEART RATE: 66 BPM | BODY MASS INDEX: 33.18 KG/M2 | WEIGHT: 224 LBS | SYSTOLIC BLOOD PRESSURE: 107 MMHG

## 2024-09-10 LAB
ANION GAP SERPL CALCULATED.3IONS-SCNC: 7 MMOL/L (ref 4–13)
BUN SERPL-MCNC: 14 MG/DL (ref 5–25)
CALCIUM SERPL-MCNC: 8.5 MG/DL (ref 8.4–10.2)
CHLORIDE SERPL-SCNC: 109 MMOL/L (ref 96–108)
CO2 SERPL-SCNC: 23 MMOL/L (ref 21–32)
CREAT SERPL-MCNC: 0.82 MG/DL (ref 0.6–1.3)
ERYTHROCYTE [DISTWIDTH] IN BLOOD BY AUTOMATED COUNT: 13.5 % (ref 11.6–15.1)
GFR SERPL CREATININE-BSD FRML MDRD: 93 ML/MIN/1.73SQ M
GLUCOSE SERPL-MCNC: 95 MG/DL (ref 65–140)
HCT VFR BLD AUTO: 38.8 % (ref 36.5–49.3)
HGB BLD-MCNC: 12.6 G/DL (ref 12–17)
MCH RBC QN AUTO: 30.4 PG (ref 26.8–34.3)
MCHC RBC AUTO-ENTMCNC: 32.5 G/DL (ref 31.4–37.4)
MCV RBC AUTO: 94 FL (ref 82–98)
PLATELET # BLD AUTO: 249 THOUSANDS/UL (ref 149–390)
PMV BLD AUTO: 9.9 FL (ref 8.9–12.7)
POTASSIUM SERPL-SCNC: 3.9 MMOL/L (ref 3.5–5.3)
RBC # BLD AUTO: 4.14 MILLION/UL (ref 3.88–5.62)
SODIUM SERPL-SCNC: 139 MMOL/L (ref 135–147)
WBC # BLD AUTO: 5.44 THOUSAND/UL (ref 4.31–10.16)

## 2024-09-10 PROCEDURE — 85027 COMPLETE CBC AUTOMATED: CPT | Performed by: INTERNAL MEDICINE

## 2024-09-10 PROCEDURE — 99239 HOSP IP/OBS DSCHRG MGMT >30: CPT | Performed by: INTERNAL MEDICINE

## 2024-09-10 PROCEDURE — 80048 BASIC METABOLIC PNL TOTAL CA: CPT | Performed by: INTERNAL MEDICINE

## 2024-09-10 RX ORDER — LEVETIRACETAM 750 MG/1
750 TABLET ORAL EVERY 12 HOURS SCHEDULED
Qty: 60 TABLET | Refills: 0 | Status: SHIPPED | OUTPATIENT
Start: 2024-09-10 | End: 2024-10-10

## 2024-09-10 RX ADMIN — PANTOPRAZOLE SODIUM 40 MG: 40 TABLET, DELAYED RELEASE ORAL at 06:05

## 2024-09-10 RX ADMIN — LORATADINE 10 MG: 10 TABLET ORAL at 09:43

## 2024-09-10 RX ADMIN — LEVETIRACETAM 750 MG: 250 TABLET, FILM COATED ORAL at 09:43

## 2024-09-10 NOTE — CASE MANAGEMENT
Case Management Assessment & Discharge Planning Note    Patient name Zain Perry  Location /-01 MRN [id number]  : 1960 Date 9/10/2024       Current Admission Date: 2024  Current Admission Diagnosis:Seizure (HCC)   Patient Active Problem List    Diagnosis Date Noted Date Diagnosed    Seizure (HCC) 2024     Aortic root dilation (HCC) 2024     Presence of cardiac pacemaker 2024     Obstructive sleep apnea syndrome 2024     Sinus pause 2024       LOS (days): 0  Geometric Mean LOS (GMLOS) (days):   Days to GMLOS:     OBJECTIVE:              Current admission status: Observation       Preferred Pharmacy:   [facility]/pharmacy #[id number] - [address] [address]  Phone: [phone] Fax: [phone]    Primary Care Provider: No primary care provider on file.    Primary Insurance: [employer]  Secondary Insurance:     ASSESSMENT:  Active Health Care Proxies       JOSE PERRY Health Care Representative - Spouse   Primary Phone: [phone] (Mobile)                 Advance Directives  Does patient have a Health Care POA?: No  Was patient offered paperwork?: Yes (declined)  Does patient currently have a Health Care decision maker?: Yes, please see Health Care Proxy section  Does patient have Advance Directives?: No  Was patient offered paperwork?: Yes (declined)         Readmission Root Cause  30 Day Readmission: No    Patient Information  Admitted from:: Home  Mental Status: Alert  During Assessment patient was accompanied by: Not accompanied during assessment  Assessment information provided by:: Patient  Primary Caregiver: Self  Support Systems: Spouse/significant other  Home entry access options. Select all that apply.: Stairs  Number of steps to enter home.: 2  Type of Current Residence: Other (Comment) (4 story home)  Living Arrangements: Lives w/ Spouse/significant other    Activities of Daily Living Prior  to Admission  Functional Status: Independent  Completes ADLs independently?: Yes  Ambulates independently?: Yes  Does patient use assisted devices?: Yes  Assisted Devices (DME) used: CPAP  DME Company Name (respiratory supplies): unsure  Does patient currently own DME?: Yes  What DME does the patient currently own?: CPAP  Does patient have a history of Outpatient Therapy (PT/OT)?: No  Does the patient have a history of Short-Term Rehab?: No  Does patient have a history of HHC?: No  Does patient currently have HHC?: No         Patient Information Continued  Does patient have prescription coverage?: Yes  Does patient receive dialysis treatments?: No  Does patient have a history of substance abuse?: No  Does patient have a history of Mental Health Diagnosis?: No         Means of Transportation  Means of Transport to App:: Drives Self      Social Determinants of Health (SDOH)      Flowsheet Row Most Recent Value   Housing Stability    In the last 12 months, was there a time when you were not able to pay the mortgage or rent on time? N   In the past 12 months, how many times have you moved where you were living? 0   At any time in the past 12 months, were you homeless or living in a shelter (including now)? N   Transportation Needs    In the past 12 months, has lack of transportation kept you from medical appointments or from getting medications? no   In the past 12 months, has lack of transportation kept you from meetings, work, or from getting things needed for daily living? No   Food Insecurity    Within the past 12 months, you worried that your food would run out before you got the money to buy more. Never true   Within the past 12 months, the food you bought just didn't last and you didn't have money to get more. Never true   Utilities    In the past 12 months has the electric, gas, oil, or water company threatened to shut off services in your home? No            DISCHARGE DETAILS:    Discharge planning discussed  with:: patient  Freedom of Choice: Yes  Comments - Freedom of Choice: no anticipated dc needs  CM contacted family/caregiver?: No- see comments (reports being in contact with his wife)  Were Treatment Team discharge recommendations reviewed with patient/caregiver?: Yes  Did patient/caregiver verbalize understanding of patient care needs?: Yes  Were patient/caregiver advised of the risks associated with not following Treatment Team discharge recommendations?: Yes      Requested Home Health Care         Is the patient interested in HHC at discharge?: No    DME Referral Provided  Referral made for DME?: No    Treatment Team Recommendation: Home  Discharge Destination Plan:: Home  Transport at Discharge : Family     Additional Comments: Met with pt to discuss the role of CM and to discuss any help pt may need prior to dc. Pt lives with his wife Lavonne in a 4 story home with 2 ANAT; bed/bathroom on the 2nd floor. Pt performed ADL's indptly pta, pt has a CPAP. No hx of HHC or rehab. No hx of mental helath or D&A treatment. Pt's preferred pharmacy is [facility] in [place]. Pt drives. Pt's wife will transport home at dc.

## 2024-09-10 NOTE — APP STUDENT NOTE
ROB STUDENT  Inpatient Progress Note for TRAINING ONLY  Not Part of Legal Medical Record     Progress Note - Zain Amador 64 y.o. male MRN: [id number]  Unit/Bed#: -Kush Encounter: [id number]    Assessment & Plan  Seizure-like activity  - Patient has mad multiple episodes of seizure-like activity requiring hospitalization occurring June 25th, August 21st, and now this visit.   - Patient did not have any symptoms or events overnight. Wore CPAP machine with no issues  - Symptoms could be due to improper CPAP machine settings causing symptoms since episodes of seizure activity have only occurred at night time  - CT head and CXR obtained on admission which are both unremarkable  - Neurology consult obtained  - Telemetry revealed no events overnight  - Discussed seizure precautions: no driving, heavy lifting, climbing heights, swimming unattended, hot tub baths  - Patient will be discharged today and note for work will be given  - Instructed to follow up with neurology, pulmonology, and cardiology outpatient  2. Transient alteration of awareness  - Continue to monitor change in symptoms  3. Obstructive Sleep Apnea  - Monitor change in symptoms and vitals at night time, could be contributory to seizure like activity               - CPAP machine ordered               - Patient counseled to follow up with pulmonology on discharge to ensure at home  machine is working properly  4. Presence of cardiac pacemaker  5. Sinus pause               - Follow up with cardiology outpatient  6. Aortic root dilation               - Continue to monitor outpatient        VTE Pharmacologic Prophylaxis:   Pharmacologic:  Encourage ambulation  Mechanical VTE Prophylaxis in Place: No    Patient Centered Rounds: I have performed bedside rounds with nursing staff today.    Discussions with Specialists or Other Care Team Provider: Neuro    Education and Discussions with Family / Patient: Patient and wife at bedside    Time Spent for Care: 30  minutes.  More than 50% of total time spent on counseling and coordination of care as described above.    Current Length of Stay: 0 day(s)    Current Patient Status: Observation   Certification Statement:  The patient will be discharged later today for absence of symptoms    Discharge Plan: Later this afternoon    Code Status: Level 1 - Full Code    Subjective:   Zain Amador is a 64 y.o. male with history of FRANCES, sinus pause, cardiac pacemaker, and aortic root dilation presented to the ED for seizure like activity including non-responsiveness and drooling form the mouth. The patient was alert and oriented x 3 today. The patient denied seizure like activity, confusion, or headaches. The patient denied chest pain, SOB, fever, chills, urinary frequency. The patient stated that he wore CPAP machine last night without issue. The patient was instructed that he can not drive for up to 6 months. The patient was instructed to follow up with neurology outpatient. The patient stated he has appointments scheduled with cardiology and pulmonology outpatient.    Objective:     Vitals:   Temp (24hrs), Av.1 °F (36.7 °C), Min:97.7 °F (36.5 °C), Max:98.2 °F (36.8 °C)    Temp:  [97.7 °F (36.5 °C)-98.2 °F (36.8 °C)] 98.2 °F (36.8 °C)  HR:  [66-68] 66  Resp:  [17-19] 19  BP: (104-113)/(69-72) 107/69  SpO2:  [91 %-94 %] 92 %  Body mass index is 33.08 kg/m².     Input and Output Summary (last 24 hours):       Intake/Output Summary (Last 24 hours) at 9/10/2024 1028  Last data filed at 9/10/2024 0901  Gross per 24 hour   Intake 890 ml   Output --   Net 890 ml       Physical Exam:     Physical Exam  Constitutional:       General: He is not in acute distress.     Appearance: Normal appearance. He is not diaphoretic.   HENT:      Head: Normocephalic and atraumatic.      Nose: Nose normal.      Mouth/Throat:      Mouth: Mucous membranes are moist.      Pharynx: Oropharynx is clear.   Eyes:      Extraocular Movements: Extraocular movements  intact.      Pupils: Pupils are equal, round, and reactive to light.   Cardiovascular:      Rate and Rhythm: Normal rate and regular rhythm.      Pulses: Normal pulses.      Heart sounds: Normal heart sounds. No murmur heard.     No gallop.   Pulmonary:      Effort: Pulmonary effort is normal. No respiratory distress.      Breath sounds: Normal breath sounds.   Chest:      Chest wall: No tenderness.   Abdominal:      Palpations: Abdomen is soft.      Tenderness: There is no abdominal tenderness. There is no guarding.   Musculoskeletal:      Cervical back: Normal range of motion. No rigidity or tenderness.   Skin:     General: Skin is warm.      Findings: No bruising, erythema, lesion or rash.   Neurological:      General: No focal deficit present.      Mental Status: He is alert and oriented to person, place, and time.      Cranial Nerves: No cranial nerve deficit.      Motor: No weakness.   Psychiatric:         Mood and Affect: Mood normal.         Behavior: Behavior normal.         Thought Content: Thought content normal.         Judgment: Judgment normal.         Historical Information   Past Medical History:   Diagnosis Date    Aneurysm of ascending aorta without rupture (HCC)     GERD (gastroesophageal reflux disease)      Past Surgical History:   Procedure Laterality Date    CARDIAC PACEMAKER PLACEMENT       Social History   Social History     Substance and Sexual Activity   Alcohol Use Not Currently     Social History     Substance and Sexual Activity   Drug Use Not on file     Social History     Tobacco Use   Smoking Status Never   Smokeless Tobacco Never     Family History: Family history non-contributory    Meds/Allergies   all medications and allergies reviewed  No Known Allergies    Additional Data:     Labs:    Results from last 7 days   Lab Units 09/10/24  0514 09/09/24  0512   WBC Thousand/uL 5.44 6.60   HEMOGLOBIN g/dL 12.6 13.1   HEMATOCRIT % 38.8 39.9   PLATELETS Thousands/uL 249 270   SEGS PCT %   --  75   LYMPHO PCT %  --  11*   MONO PCT %  --  10   EOS PCT %  --  2     Results from last 7 days   Lab Units 09/10/24  0514 09/09/24  0523   SODIUM mmol/L 139 138   POTASSIUM mmol/L 3.9 3.7   CHLORIDE mmol/L 109* 108   CO2 mmol/L 23 24   BUN mg/dL 14 13   CREATININE mg/dL 0.82 0.80   ANION GAP mmol/L 7 6   CALCIUM mg/dL 8.5 8.4   ALBUMIN g/dL  --  3.8   TOTAL BILIRUBIN mg/dL  --  0.96   ALK PHOS U/L  --  53   ALT U/L  --  46   AST U/L  --  27   GLUCOSE RANDOM mg/dL 95 97                 Results from last 7 days   Lab Units 09/09/24  0622   LACTIC ACID mmol/L 0.8         * I Have Reviewed All Lab Data Listed Above.  * Additional Pertinent Lab Tests Reviewed: All Labs Within Last 24 Hours Reviewed    Imaging:    Imaging Reports Reviewed Today Include: No pertinent imaging studies      Recent Cultures (last 7 days):           Last 24 Hours Medication List:   Current Facility-Administered Medications   Medication Dose Route Frequency Provider Last Rate    acetaminophen  650 mg Oral Q6H PRN Sobia Martinez MD      levETIRAcetam  750 mg Oral Q12H [facility] Liz Barreto PA-C      loratadine  10 mg Oral Daily Sobia Martinez MD      pantoprazole  40 mg Oral Daily Before Breakfast Sobia Martinez MD          Today, Patient Was Seen By: Jose COSTA    ** Please Note: Dictation voice to text software may have been used in the creation of this document. **

## 2024-09-10 NOTE — PLAN OF CARE
Problem: PAIN - ADULT  Goal: Verbalizes/displays adequate comfort level or baseline comfort level  Description: Interventions:  - Encourage patient to monitor pain and request assistance  - Assess pain using appropriate pain scale  - Administer analgesics based on type and severity of pain and evaluate response  - Implement non-pharmacological measures as appropriate and evaluate response  - Consider cultural and social influences on pain and pain management  - Notify physician/advanced practitioner if interventions unsuccessful or patient reports new pain  Outcome: Progressing     Problem: INFECTION - ADULT  Goal: Absence or prevention of progression during hospitalization  Description: INTERVENTIONS:  - Assess and monitor for signs and symptoms of infection  - Monitor lab/diagnostic results  - Monitor all insertion sites, i.e. indwelling lines, tubes, and drains  - Monitor endotracheal if appropriate and nasal secretions for changes in amount and color  - Sayre appropriate cooling/warming therapies per order  - Administer medications as ordered  - Instruct and encourage patient and family to use good hand hygiene technique  - Identify and instruct in appropriate isolation precautions for identified infection/condition  Outcome: Progressing  Goal: Absence of fever/infection during neutropenic period  Description: INTERVENTIONS:  - Monitor WBC    Outcome: Progressing

## 2024-09-11 NOTE — TELEPHONE ENCOUNTER
Patient scheduled for 10-2- 24/Bertin/Olman/9 am       Patient to be scheduled for 6 week, but patient only given enough Keppra for 4 weeks

## 2024-09-12 NOTE — DISCHARGE SUMMARY
Discharge Summary - Hospitalist   Name: Zain Amador 64 y.o. male I MRN: [id number]  Unit/Bed#: -01 I Date of Admission: 9/9/2024   Date of Service: 9/11/2024 I Hospital Day: 0     Assessment & Plan  Seizure (HCC)  Patient presented with episode of unresponsiveness during the night.  Per patient and wife, he went to bed wearing his CPAP and woke up with 6 people from the EMS around him.  Per wife he was unresponsive and flaccid with abnormal respirations, no shaking activity.  Patient had postictal confusion  Patient had a few similar episodes in the recent past, that occurred during the night only.  After the first episode it was thought patient might have had a pulseless activity and had a pacemaker implanted.  Had significant workup for the seizure at [facility] recently including MRI brain that was negative and EEG.  Patient had pacemaker interrogation that was unremarkable   Neurology was consulted.  Per neurology excluded to have cardiac origin and unlikely FRANCES, there is a concern for seizure.  Patient was loaded with Keppra and continue scheduled Keppra afterwards.  No need for repeat EEG while inpatient or brain MRI.    Patient's hospitalization was uneventful.  Did not have any seizure-like activity.  Discharged on Keppra 750 mg every 12 hours and follow-up with neurology outpatient.  Patient not allowed to drive until cleared by neurology/[employer].        Obstructive sleep apnea syndrome  Continue CPAP at bedtime    Presence of cardiac pacemaker  Noted     Medical Problems      Discharging Physician / Practitioner: Sobia Martinez MD  PCP: No primary care provider on file.  Admission Date:   Admission Orders (From admission, onward)       Ordered        09/09/24 0635  Place in Observation  Once                          Discharge Date: 09/10/24    Consultations During Hospital Stay:  Neurology    Procedures Performed:   None    Significant Findings / Test Results:   None    Incidental Findings:  "  None    Test Results Pending at Discharge (will require follow up):   None     Outpatient Tests Requested:  None    Complications: None    Reason for Admission: Loss of consciousness.    Hospital Course:   Zain Amador is a 64 y.o. male patient with past medical history of prior seizure-like events, sinus pause status post PPM, FRANCES on CPAP who originally presented to the hospital on 9/9/2024 due to episode of loss of consciousness.  Patient had a first episode a few months ago, he was found to be flaccid and unresponsive with normal respirations during the night.  On arrival in the emergency department patient had a sinus pause of 15 seconds and patient had a pacemaker placed at that time.  Similar event last month, patient was admitted at [facility], neurology was consulted also PPM was interrogated and was unremarkable for arrhythmias.  Patient was discharged home without any antiseizure medication, it was thought that patient had symptoms secondary to FRANCES.    Neurology was consulted, suspicion for seizure so patient was started on Keppra.  Patient had no symptoms during hospitalization.  Discharged home on Keppra and follow-up closely with neurology.      Please see above list of diagnoses and related plan for additional information.     Condition at Discharge: good    Discharge Day Visit / Exam:   Subjective: No symptoms  Vitals: Blood Pressure: 107/69 (09/10/24 0817)  Pulse: 66 (09/10/24 0817)  Temperature: 98.2 °F (36.8 °C) (09/10/24 0817)  Temp Source: Oral (09/10/24 0817)  Respirations: 19 (09/10/24 0817)  Height: 5' 9\" (175.3 cm) (09/09/24 0746)  Weight - Scale: 102 kg (224 lb) (09/09/24 0408)  SpO2: 92 % (09/10/24 0817)  Exam:   Physical Exam  Vitals reviewed.   Constitutional:       General: He is not in acute distress.     Appearance: He is not diaphoretic.   Eyes:      General: No scleral icterus.        Right eye: No discharge.         Left eye: No discharge.   Cardiovascular:      Rate and Rhythm: Normal " rate and regular rhythm.   Pulmonary:      Effort: Pulmonary effort is normal. No respiratory distress.      Breath sounds: Normal breath sounds. No wheezing or rales.   Abdominal:      General: There is no distension.      Palpations: Abdomen is soft.      Tenderness: There is no abdominal tenderness. There is no guarding or rebound.   Musculoskeletal:      Right lower leg: No edema.      Left lower leg: No edema.   Neurological:      Mental Status: He is alert and oriented to person, place, and time.   Psychiatric:         Mood and Affect: Mood normal.         Behavior: Behavior normal.         Thought Content: Thought content normal.         Judgment: Judgment normal.          Discussion with Family: Updated  (wife) at bedside.    Discharge instructions/Information to patient and family:   See after visit summary for information provided to patient and family.      Provisions for Follow-Up Care:  See after visit summary for information related to follow-up care and any pertinent home health orders.      Mobility at time of Discharge:   Basic Mobility Inpatient Raw Score: 24  JH-HLM Goal: 8: Walk 250 feet or more  JH-HLM Achieved: 8: Walk 250 feet ot more  HLM Goal achieved. Continue to encourage appropriate mobility.     Disposition:   Home    Planned Readmission: no    Discharge Medications:  See after visit summary for reconciled discharge medications provided to patient and/or family.      Administrative Statements   Discharge Statement:  I have spent a total time of 40 minutes in caring for this patient on the day of the visit/encounter. .    **Please Note: This note may have been constructed using a voice recognition system**

## 2024-09-12 NOTE — ASSESSMENT & PLAN NOTE
Patient presented with episode of unresponsiveness during the night.  Per patient and wife, he went to bed wearing his CPAP and woke up with 6 people from the EMS around him.  Per wife he was unresponsive and flaccid with abnormal respirations, no shaking activity.  Patient had postictal confusion  Patient had a few similar episodes in the recent past, that occurred during the night only.  After the first episode it was thought patient might have had a pulseless activity and had a pacemaker implanted.  Had significant workup for the seizure at [facility] recently including MRI brain that was negative and EEG.  Patient had pacemaker interrogation that was unremarkable   Neurology was consulted.  Per neurology excluded to have cardiac origin and unlikely FRANCES, there is a concern for seizure.  Patient was loaded with Keppra and continue scheduled Keppra afterwards.  No need for repeat EEG while inpatient or brain MRI.    Patient's hospitalization was uneventful.  Did not have any seizure-like activity.  Discharged on Keppra 750 mg every 12 hours and follow-up with neurology outpatient.  Patient not allowed to drive until cleared by neurology/[employer].

## 2024-09-24 ENCOUNTER — TELEPHONE (OUTPATIENT)
Age: 64
End: 2024-09-24

## 2024-09-24 NOTE — TELEPHONE ENCOUNTER
Patient has called the scheduling department requesting to speak with the doctor he saw in the hospital who prescribed him AEDS. 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  I gave him the option to wait until his visit coming up and then possibly get it there. Patient stated he will like to get it as soon as possible and he will like for us to call him back with updates on weather he can get this letter to work.

## 2024-09-26 NOTE — TELEPHONE ENCOUNTER
Thanks,   Called patient back and explained above statement.Patient understood will wait til his appointment. No other issues or concerns .

## 2024-10-02 ENCOUNTER — OFFICE VISIT (OUTPATIENT)
Dept: NEUROLOGY | Facility: CLINIC | Age: 64
End: 2024-10-02
Payer: COMMERCIAL

## 2024-10-02 VITALS
BODY MASS INDEX: 33.92 KG/M2 | SYSTOLIC BLOOD PRESSURE: 130 MMHG | DIASTOLIC BLOOD PRESSURE: 90 MMHG | HEART RATE: 71 BPM | WEIGHT: 229 LBS | HEIGHT: 69 IN

## 2024-10-02 DIAGNOSIS — G31.84 MILD COGNITIVE IMPAIRMENT: ICD-10-CM

## 2024-10-02 DIAGNOSIS — G40.909 RECURRENT SEIZURES (HCC): Primary | ICD-10-CM

## 2024-10-02 PROBLEM — K21.9 GASTROESOPHAGEAL REFLUX DISEASE: Status: ACTIVE | Noted: 2024-06-06

## 2024-10-02 PROBLEM — Z86.74 HISTORY OF CARDIAC ARREST: Status: ACTIVE | Noted: 2024-08-21

## 2024-10-02 PROBLEM — K63.5 POLYP OF COLON: Status: ACTIVE | Noted: 2024-06-06

## 2024-10-02 PROBLEM — R00.1 SYMPTOMATIC SINUS BRADYCARDIA: Status: ACTIVE | Noted: 2024-08-21

## 2024-10-02 PROBLEM — E66.9 OBESITY: Status: ACTIVE | Noted: 2024-06-06

## 2024-10-02 PROCEDURE — G2212 PROLONG OUTPT/OFFICE VIS: HCPCS | Performed by: PSYCHIATRY & NEUROLOGY

## 2024-10-02 PROCEDURE — 99215 OFFICE O/P EST HI 40 MIN: CPT | Performed by: PSYCHIATRY & NEUROLOGY

## 2024-10-02 RX ORDER — CETIRIZINE HYDROCHLORIDE 10 MG/1
10 TABLET ORAL DAILY
COMMUNITY

## 2024-10-02 RX ORDER — FAMOTIDINE 20 MG/1
20 TABLET, FILM COATED ORAL DAILY
COMMUNITY

## 2024-10-02 RX ORDER — DIPHENOXYLATE HYDROCHLORIDE AND ATROPINE SULFATE 2.5; .025 MG/1; MG/1
1 TABLET ORAL DAILY
COMMUNITY

## 2024-10-02 RX ORDER — LEVETIRACETAM 750 MG/1
1500 TABLET, FILM COATED, EXTENDED RELEASE ORAL
Qty: 60 TABLET | Refills: 5 | Status: SHIPPED | OUTPATIENT
Start: 2024-10-02

## 2024-10-02 NOTE — LETTER
October 2, 2024     Patient: Zain Amador  YOB: 1960  Date of Visit: 10/2/2024      To Whom it May Concern:    Zain Amador is under my professional care regarding an evaluation for sleep related (nocturnal seizures). Zain was seen in my office on 10/2/2024.  During my evaluation, it seems that Mr. Amador only experiences episodes of confusion and incoherent behaviors that happen out of sleep and no episodes has happened while he has been awake.  He will be on an antiseizure medication to prevent these episodes.  He cannot drive for at least 6 months from his last clinical episode (9/9/2024).  I have no objection to him working as a , as long as he is not required to drive.      If you have any questions or concerns, please don't hesitate to call [phone].       Sincerely,          Leah Denton MD        CC: No Recipients

## 2024-10-02 NOTE — PATIENT INSTRUCTIONS
Recurrent Sleep related episodes of confusion, suspected that these are sleep related seizures.  - change Levetiracetam to extended release version (ER) 750mg tab take two tabs at night or bedtime  - 24 hours ambulatory EEG study  - in 2-4 weeks check blood work in the morning for levetiracetam level and BMP  - please speak to your sleep specialist about completing a formal polysomnogram with Full EEG montage to assess for sleep related behaviors (parasomnia/REM sleep disorders).    Mild Cognitive impairment with memory difficulty  - we need a copy of your MRI brain study from [facility]  - referral to [facility] or any neuropsychologist for cognitive testing  - check syphilis antibodies, B1, B12, SPEP, homocysteine, methylmalonic acid    Follow-up in 3 months.    Seizure protocol  If he has a seizure that last less than 5 minutes then allow him to recover at home; just get him to the ground for safety.  Call 911 if the following conditions apply  - unwitnessed onset of seizure and there is a potential head injury that needs to be evaluated  - patient stops breathing or turns blue during a seizure  - if the seizure is longer than 5 minutes  - if after the seizure ends and he does not show recovery over the course of 30 minutes.  - if there are multiple seizures in 24 hours  - if the patient refused taking him medications for the past 24 hours  - if there is physical injury from the seizure    He may work as a .  He should not drive for at least 6 months from the last seizure.    FIRST AID FOR SEIZURES    What to Do If You Witness a Seizure:     Generalized convulsive (called a generalized tonic-clonic or grand mal seizure). During this seizure, the person may cry out, suddenly stiffen up, make jerking movements, and fall.  The person may turn pale or blue from difficulty breathing.    Actions:  Stay calm. Talk in a soothing voice and if possible keep onlookers away.  Prevent injury. Move objects  away that the person might hit while jerking uncontrollably.   Time when the seizure starts and ends. Most seizures stop after only a few minutes.  Turn him or her gently onto one side. This will help keep the airway clear.   Never place anything in his/her mouth or give him/her anything by mouth during a seizure.   -- Do not give the person water, pills, or food until fully alert.   Loosen tight clothing or jewelry around his/her neck.  Make the person as comfortable as possible.   Place something soft under their head.   Do not hold the person down. If the person having a seizure thrashes around there is no need for you to restrain them. They are more likely to be combative if restrained. Remember to consider your safety as well.   Keep onlookers away.   Be sensitive and supportive, and ask others to do the same.   Stay with the person until he/she is fully alert.    Complex partial seizure (confusional spells). During this kind of seizure, the person may have a glassy stare; give no response or inappropriate responses when questioned; sit, stand, or walk about aimlessly; make lip smacking or chewing motions; fidget with clothes; appear to be drunk, drugged, or confused.    Actions:  Make sure the person is safe and won’t harm themselves.  Try to remove harmful objects from around the person (tools, utensils, glasses).  Do NOT be aggressive or attempt to restrain the person. They are more likely to be combative if restrained. Remember to consider your safety as well.  Help prevent the person from wandering, and direct the person to chair or safe position.  Never place anything in his/her mouth or give him/her anything by mouth during a seizure.   -- Do not give the person water, pills, or food until fully alert.   Keep onlookers away.   Be sensitive and supportive, and ask others to do the same.   Stay with the person until he/she is fully alert.    CALL 911 if:  A convulsive seizure lasts more than 5  minutes.  The person turns blue during the seizure.  The person does not start breathing after the seizure. Begin mouth to mouth resuscitation if this would occur.  The person has one seizure right after the other without coming back to normal consciousness between the seizures.  The person has not regained consciousness or is still confused after 30 minutes.  You know the person does not have epilepsy.  You know the person has diabetes or low blood sugar.  The person is pregnant, ill, or injured.   The seizure occurred in water, because the person may have inhaled water.  The person requests an ambulance or medical help.     Rescue medication  Your doctor may prescribe a rescue medication such as lorazepam (Ativan), diazepam (Valium / Diastat), or clonazepam (Klonopin) to terminate a seizure or if you have a history of cluster of seizures.  Follow the instructions given by your doctor for these medications    Recognizing Common Seizures (examples)   Simple partial seizures: Isolated twitching, numbness, sweating, dizziness, nausea/vomiting, disturbances to hearing, vision, smell or taste. No loss of consciousness occurs, and the person remains aware of his/her environment.   Complex partial seizures: Staring, motionless, picking at clothes, smacking lips, swallowing repeatedly or wandering around. The person is not aware of their surroundings and is not fully responsive.  Atonic seizures: “Drop attacks” or sudden, rapid fall to ground with rapid recovery.   Myoclonic seizures: Brief forceful jerks which can affect the whole body or just part of it.   Absence seizures: May appear to be daydreaming or “spacing out.” The person is momentarily unresponsive and unaware of what is happening around him/her.   Tonic seizures: Stiffening of part or of the entire body.  Generalized Tonic-Clonic Seizures. “Grand-mal seizure.” Sudden loss of consciousness with body stiffening followed by continuous jerking movements. A blue  tinge around the mouth is likely but lack of oxygen is rare. Loss of bladder and/or bowel control may occur.
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
## 2024-10-03 ENCOUNTER — TELEPHONE (OUTPATIENT)
Age: 64
End: 2024-10-03

## 2024-10-03 NOTE — TELEPHONE ENCOUNTER
Called Pt in regards to routine referral, in attempts to verify needs of services and advise of current wait list.  No answer, lvm for patient to call back at [phone].    1st attempt.

## 2024-10-03 NOTE — TELEPHONE ENCOUNTER
Patient returned call in regards to referrals stating their neurologist placed the referral for patient to be tested for memory issues. Writer notified patient in order for them to be seen through psychology dept they would have to be seen by [facility] psychiatrist who would then refer patient to psychology. Patient showed verbal understanding and agreed to be wait listed at this time.         KALA Bejarano

## 2024-10-04 NOTE — ASSESSMENT & PLAN NOTE
- we need a copy of your MRI brain study from [facility]  - referral to [facility] or any neuropsychologist for cognitive testing  - check syphilis antibodies, B1, B12, SPEP, homocysteine, methylmalonic acid

## 2024-10-04 NOTE — ASSESSMENT & PLAN NOTE
Recurrent Sleep related episodes of confusion, suspected that these are sleep related seizures.  - change Levetiracetam to extended release version (ER) 750mg tab take two tabs at night or bedtime  - 24 hours ambulatory EEG study  - in 2-4 weeks check blood work in the morning for levetiracetam level and BMP  - please speak to your sleep specialist about completing a formal polysomnogram with Full EEG montage to assess for sleep related behaviors (parasomnia/REM sleep disorders).

## 2024-11-07 ENCOUNTER — HOSPITAL ENCOUNTER (OUTPATIENT)
Dept: NEUROLOGY | Facility: CLINIC | Age: 64
Discharge: HOME | End: 2024-11-07

## 2024-11-07 DIAGNOSIS — G40.909 RECURRENT SEIZURES (HCC): ICD-10-CM

## 2024-11-08 ENCOUNTER — HOSPITAL ENCOUNTER (OUTPATIENT)
Dept: NEUROLOGY | Facility: CLINIC | Age: 64
End: 2024-11-08
Payer: COMMERCIAL

## 2024-11-08 DIAGNOSIS — G40.909 RECURRENT SEIZURES (HCC): ICD-10-CM

## 2024-11-08 PROCEDURE — 95708 EEG WO VID EA 12-26HR UNMNTR: CPT

## 2024-11-11 PROCEDURE — 95719 EEG PHYS/QHP EA INCR W/O VID: CPT | Performed by: PSYCHIATRY & NEUROLOGY

## 2024-11-12 ENCOUNTER — TELEPHONE (OUTPATIENT)
Age: 64
End: 2024-11-12

## 2024-11-12 DIAGNOSIS — G40.909 RECURRENT SEIZURES (HCC): Primary | ICD-10-CM

## 2024-11-12 RX ORDER — LEVETIRACETAM 750 MG/1
2250 TABLET, FILM COATED, EXTENDED RELEASE ORAL
Qty: 90 TABLET | Refills: 5 | Status: SHIPPED | OUTPATIENT
Start: 2024-11-12 | End: 2024-12-05

## 2024-11-12 NOTE — TELEPHONE ENCOUNTER
----- Message from Leah Denton MD sent at 11/11/2024 10:28 AM EST -----  Recurrent electrographic seizures found from sleep.   Please reach out to the patient to ask if he was aware of having seizures from sleep.  Is he taking levetiracetam ER 750mg two tabs at bedtime?  Any side effect on this medication?  If no side effect and he is taking his medication recommend increasing dose to three tabs at bedtime (2250mg).

## 2024-11-12 NOTE — TELEPHONE ENCOUNTER
Signed off on prescription for higher dose of levetiracetam.  Please remind him to get blood work completed prior to his next follow-up visit.  I would also like him to schedule another 24 hours ambulatory EEG study to assess if there are seizures from sleep on the higher dose of Levetiracetam.

## 2024-11-12 NOTE — TELEPHONE ENCOUNTER
Called re: below and spoke with pt, who verbalized an understanding. He reports that he is aware of the nocturnal seizures. He confirms that he is taking levetiracetam ER 1,500 mg at HS and also that he is agreeable to increasing to 2,250 mg at HS. The only side effect he has noticed is a slight weight gain of 5 lbs. He would like the new dose sent to [facility] in [place] please.    Please sign off if agreeable. Thank you.

## 2024-11-14 NOTE — TELEPHONE ENCOUNTER
Called re: below and spoke with pt, who verbalized an understanding. He would like the scripts and a reminder to schedule the EEG mailed to him at his home (done).

## 2024-11-27 ENCOUNTER — TELEPHONE (OUTPATIENT)
Age: 64
End: 2024-11-27

## 2024-11-27 DIAGNOSIS — G40.909 RECURRENT SEIZURES (HCC): ICD-10-CM

## 2024-11-27 NOTE — TELEPHONE ENCOUNTER
Janeth Camejo MD to Me  Leah Denton MD     11/27/24  1:29 PM  Hello, I am covering for Dr. Denton. He can take famotidine and he does not need to fast.  ----------------------------------------------------------------    Outbound call made to Patient and he was made aware of Dr. Camejo's advisement. Patient verbalized understanding.

## 2024-11-27 NOTE — TELEPHONE ENCOUNTER
Inbound call received from Patient to clarify if he needs to do anything special for the lab blood work. Zeeshan has an appointment with Pedro this Saturday to get the blood work done. Patient made aware per chart on BMP: This is a patient instruction: Patient fasting for 8 hours or longer recommended. Patient verbalized understanding.     Patient states he takes famotidine in the morning and wants to know if he needs to hold that before blood work.     Patient confirmed the best call back number is [phone] and we may leave a detailed message if needed.     Dr. Denton please clarify if any medication should be held prior to blood work. Patient takes famotidine in AM. Thank you!

## 2024-12-05 ENCOUNTER — RESULTS FOLLOW-UP (OUTPATIENT)
Dept: NEUROLOGY | Facility: CLINIC | Age: 64
End: 2024-12-05

## 2024-12-05 LAB
ALBUMIN SERPL ELPH-MCNC: 4 G/DL (ref 3.8–4.8)
ALPHA1 GLOB SERPL ELPH-MCNC: 0.2 G/DL (ref 0.2–0.3)
ALPHA2 GLOB SERPL ELPH-MCNC: 0.5 G/DL (ref 0.5–0.9)
BETA1 GLOB SERPL ELPH-MCNC: 0.4 G/DL (ref 0.4–0.6)
BETA2 GLOB SERPL ELPH-MCNC: 0.3 G/DL (ref 0.2–0.5)
BUN SERPL-MCNC: 13 MG/DL (ref 7–25)
BUN/CREAT SERPL: NORMAL (CALC) (ref 6–22)
CALCIUM SERPL-MCNC: 9 MG/DL (ref 8.6–10.3)
CHLORIDE SERPL-SCNC: 106 MMOL/L (ref 98–110)
CO2 SERPL-SCNC: 28 MMOL/L (ref 20–32)
CREAT SERPL-MCNC: 0.98 MG/DL (ref 0.7–1.35)
GAMMA GLOB SERPL ELPH-MCNC: 0.7 G/DL (ref 0.8–1.7)
GFR/BSA.PRED SERPLBLD CYS-BASED-ARV: 86 ML/MIN/1.73M2
GLUCOSE SERPL-MCNC: 87 MG/DL (ref 65–99)
HCYS SERPL-SCNC: 8.5 UMOL/L
LEVETIRACETAM SERPL-MCNC: 25.8 MCG/ML
METHYLMALONATE SERPL-SCNC: 112 NMOL/L (ref 69–390)
POTASSIUM SERPL-SCNC: 4.3 MMOL/L (ref 3.5–5.3)
PROT SERPL-MCNC: 6.2 G/DL (ref 6.1–8.1)
RPR SER QL: NORMAL
SODIUM SERPL-SCNC: 140 MMOL/L (ref 135–146)
VIT B1 BLD-SCNC: 139 NMOL/L (ref 78–185)
VIT B12 SERPL-MCNC: 327 PG/ML (ref 200–1100)

## 2024-12-05 RX ORDER — LEVETIRACETAM 750 MG/1
3000 TABLET, FILM COATED, EXTENDED RELEASE ORAL
Qty: 120 TABLET | Refills: 5 | Status: SHIPPED | OUTPATIENT
Start: 2024-12-05

## 2024-12-05 NOTE — TELEPHONE ENCOUNTER
Phone call to patient regarding Dr Denton's message below. Advised same. Patient is agreeable to increasing the Levetiracetam ER to four 750 mg tabs at bedtime. Advised Dr. Denton has already sent an updated script to patient's pharmacy. Advised patient to get his Levetiracetam level checked prior to his upcoming appointment with Dr. Denton on 1-2-25.    [facility],   Please mail a copy of Levetiracetam labs order to patient's home per his request. Patient uses [employer]. Thank you!

## 2024-12-05 NOTE — TELEPHONE ENCOUNTER
12/05/24    Patient Called office today with Wife on the Line to notify Dr. Denton the Following:   Patient stated that he had a Seizure Episode Last Night.    Patient Wife Stated that he went to bed at 9:00 PM and around 9:45 PM he had the Seizure and did not come-out of the seizure until half an hour later.    Patient Wife would like to know any advice or suggestion of what to do with his current episode.    Patient is also requesting for BLOOD WORK Results from 11/30/24.    I Offered to speak to a Nurse, but Patient and Wife Kindly declined and just requested a call back with Advice and Results.      Please contact Patient and Wife.  Thank You.

## 2024-12-05 NOTE — TELEPHONE ENCOUNTER
"Phone call to patient regarding recent reported seizure. Patient does not remember anything about the seizure.  PATIENT GAVE VERBAL PERMISSION TO SPEAK WITH SPOUSE JOSE. I spoke with patient's spouse Jose. This was a typical seizure. Around 9:45 pm, patient was in bed. Jose heard patient making noises. Jose went to patient's bedroom and removed patient's CPAP. Patient was \"out in space.\" Jose asked patient questions. Patient was not able to answer the questions correctly for 30 minutes. Patient went back to baseline and eventually back to sleep.     Confirmed patient is taking medication as directed.  Levetiracetam 750 MG TB24 extended-release tablet   Take 3 tablets (2,250 mg total) by mouth daily at bedtime     # [phone]    Dr. Denton, patient is concerned with seizure activity and recent day time sleepiness. Please advise. Thank you!  "

## 2024-12-05 NOTE — TELEPHONE ENCOUNTER
Recommend increasing Levetiracetam ER to four 750mg tabs at bedtime.  If he continues to have seizures then will need to add on another antiseizure medication such as Oxcarbazepine (Trileptal); side effects of oxcarbazepine, which include, but not limited to, drug rash, Perfecto Mike syndrome, liver toxicity, mood swings, irritability, suicidal ideation, abnormal liver enzymes, medication interactions, ataxia, incoordination, cognitive impairment, GI upset, nausea, vomiting and double vision.

## 2024-12-16 ENCOUNTER — HOSPITAL ENCOUNTER (OUTPATIENT)
Dept: NEUROLOGY | Facility: CLINIC | Age: 64
Discharge: HOME | End: 2024-12-16

## 2024-12-16 DIAGNOSIS — G40.909 RECURRENT SEIZURES (HCC): ICD-10-CM

## 2024-12-17 ENCOUNTER — HOSPITAL ENCOUNTER (OUTPATIENT)
Dept: NEUROLOGY | Facility: CLINIC | Age: 64
Discharge: HOME | End: 2024-12-17
Payer: COMMERCIAL

## 2024-12-17 ENCOUNTER — RESULTS FOLLOW-UP (OUTPATIENT)
Dept: OTHER | Facility: HOSPITAL | Age: 64
End: 2024-12-17

## 2024-12-17 DIAGNOSIS — G40.909 RECURRENT SEIZURES (HCC): ICD-10-CM

## 2024-12-17 PROCEDURE — 95708 EEG WO VID EA 12-26HR UNMNTR: CPT

## 2024-12-17 PROCEDURE — 95719 EEG PHYS/QHP EA INCR W/O VID: CPT | Performed by: STUDENT IN AN ORGANIZED HEALTH CARE EDUCATION/TRAINING PROGRAM

## 2024-12-17 NOTE — TELEPHONE ENCOUNTER
----- Message from Leah Denton MD sent at 12/17/2024  5:01 PM EST -----  Ambulatory EEG study did not capture an electrographic seizure.  Since his prior ambulatory EEG, has there been any further clinical seizure?

## 2024-12-17 NOTE — TELEPHONE ENCOUNTER
"Outgoing call to patient regarding Dr. Denton's message below. Patient denies any recent seizure activity.     Reviewed lab results and Dr. Denton's notes. \"He should take B12 supplements and repeat at the next visit.\"   Next OV 1-2-25.     Patient goes to [employer]. Patient provided [employer] Contact Number [phone]. Patient requested lab order to be faxed to [facility] as he never received the hard copy in the mail and had to cancel his last appointment.     Dr Denton,   Other than B-12 and Levetiracetam, are there any other labs you want patient to complete prior to his 1-2-25 visit with you? Please advise. Thank you!      "

## 2024-12-30 ENCOUNTER — TELEPHONE (OUTPATIENT)
Age: 64
End: 2024-12-30

## 2024-12-30 NOTE — TELEPHONE ENCOUNTER
He does not need to get B12 lab done prior to the next visit.  I'll order it at this follow-up visit.

## 2024-12-30 NOTE — TELEPHONE ENCOUNTER
"Pt calling in to see if he still needs to complete B12 repeat lab that Dr Denton suggested.   Per chart Dr Denton stated \"He should take B12 supplements and repeat at the next visit.\"   Next OV 1-2-25.       Pt is using [employer], please fax lab script to [phone].     Please contact pt once fax is sent. Thank you  "

## 2025-01-02 ENCOUNTER — OFFICE VISIT (OUTPATIENT)
Dept: NEUROLOGY | Facility: CLINIC | Age: 65
End: 2025-01-02
Payer: COMMERCIAL

## 2025-01-02 VITALS
TEMPERATURE: 98.2 F | HEIGHT: 69 IN | RESPIRATION RATE: 14 BRPM | OXYGEN SATURATION: 98 % | BODY MASS INDEX: 35.46 KG/M2 | DIASTOLIC BLOOD PRESSURE: 82 MMHG | WEIGHT: 239.4 LBS | SYSTOLIC BLOOD PRESSURE: 130 MMHG | HEART RATE: 77 BPM

## 2025-01-02 DIAGNOSIS — G40.209 FOCAL EPILEPSY WITH IMPAIRMENT OF CONSCIOUSNESS (HCC): Primary | ICD-10-CM

## 2025-01-02 DIAGNOSIS — G31.84 MILD COGNITIVE IMPAIRMENT: ICD-10-CM

## 2025-01-02 PROCEDURE — 99215 OFFICE O/P EST HI 40 MIN: CPT | Performed by: PSYCHIATRY & NEUROLOGY

## 2025-01-02 RX ORDER — LEVETIRACETAM 750 MG/1
3000 TABLET, FILM COATED, EXTENDED RELEASE ORAL
Qty: 360 TABLET | Refills: 1 | Status: SHIPPED | OUTPATIENT
Start: 2025-01-02

## 2025-01-02 RX ORDER — LANOLIN ALCOHOL/MO/W.PET/CERES
1000 CREAM (GRAM) TOPICAL DAILY
COMMUNITY

## 2025-01-02 RX ORDER — PANTOPRAZOLE SODIUM 20 MG/1
TABLET, DELAYED RELEASE ORAL
COMMUNITY
Start: 2024-12-12

## 2025-01-02 NOTE — PATIENT INSTRUCTIONS
Recurrent Sleep related episodes of confusion, suspected that these are sleep related seizures.  - Continue with Levetiracetam ER 750mg tab take 4 tabs at bedtime  - Check Levetiracetam level prior to next visit  - 48 hours ambulatory EEG study    Mild Cognitive impairment with memory difficulty  - continue to take B12 supplements  - we need a copy of your MRI brain study from [facility]  - Neuropsychology evaluation for memory and mild cognitive impairment  Some options to get a sooner neuropsychologic appointment are:   [facility] ([place]): [phone]  [facility] ([place]): [phone]  [facility]): [phone]  [facility] ([place]): [phone]      Follow-up in 4 months    Seizure protocol  If he has a seizure that last less than 5 minutes then allow him to recover at home; just get him to the ground for safety.  Call 911 if the following conditions apply  - unwitnessed onset of seizure and there is a potential head injury that needs to be evaluated  - patient stops breathing or turns blue during a seizure  - if the seizure is longer than 5 minutes  - if after the seizure ends and he does not show recovery over the course of 30 minutes.  - if there are multiple seizures in 24 hours  - if the patient refused taking him medications for the past 24 hours  - if there is physical injury from the seizure    He may work as a .  He should not drive for at least 6 months from the last seizure.
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
## 2025-01-04 NOTE — ASSESSMENT & PLAN NOTE
Right temporal seizures during sleep  - Continue with Levetiracetam ER 750mg tab take 4 tabs at bedtime  - Check Levetiracetam level prior to next visit  - 48 hours ambulatory EEG study

## 2025-01-04 NOTE — ASSESSMENT & PLAN NOTE
- continue to take B12 supplements  - we need a copy of your MRI brain study from [facility]  - Neuropsychology evaluation for memory and mild cognitive impairment

## 2025-01-10 ENCOUNTER — TELEPHONE (OUTPATIENT)
Age: 65
End: 2025-01-10

## 2025-01-10 NOTE — TELEPHONE ENCOUNTER
Patient called to report that they have secured a appointment with [facility] for Neuropsych testing and requesting that the referral be  changed to outgoing to [facility].      [facility] Fax # [phone]       Thank you!

## 2025-01-10 NOTE — TELEPHONE ENCOUNTER
Patient called back and stated that [facility] can provide a sooner appointment; patient requesting to fax referral there instead.    Please assist,    Thank you    FAX #[phone]

## 2025-02-25 ENCOUNTER — TELEPHONE (OUTPATIENT)
Age: 65
End: 2025-02-25

## 2025-02-25 NOTE — TELEPHONE ENCOUNTER
Pt called in and wanted to see when his next appt was with Neurology.     I verified that the pt is scheduled 5/7/25 at 4:00 pm with Dr. Elsa Miller Office.

## 2025-02-27 ENCOUNTER — HOSPITAL ENCOUNTER (EMERGENCY)
Facility: HOSPITAL | Age: 65
Discharge: HOME | End: 2025-02-28
Attending: EMERGENCY MEDICINE
Payer: COMMERCIAL

## 2025-02-27 DIAGNOSIS — G40.919 BREAKTHROUGH SEIZURE (HCC): Primary | ICD-10-CM

## 2025-02-27 LAB
ANION GAP SERPL CALCULATED.3IONS-SCNC: 11 MMOL/L (ref 4–13)
BASOPHILS # BLD AUTO: 0.04 THOUSANDS/ÂΜL (ref 0–0.1)
BASOPHILS NFR BLD AUTO: 1 % (ref 0–1)
BUN SERPL-MCNC: 22 MG/DL (ref 5–25)
CALCIUM SERPL-MCNC: 9 MG/DL (ref 8.4–10.2)
CHLORIDE SERPL-SCNC: 104 MMOL/L (ref 96–108)
CO2 SERPL-SCNC: 22 MMOL/L (ref 21–32)
CREAT SERPL-MCNC: 1.13 MG/DL (ref 0.6–1.3)
EOSINOPHIL # BLD AUTO: 0.45 THOUSAND/ÂΜL (ref 0–0.61)
EOSINOPHIL NFR BLD AUTO: 7 % (ref 0–6)
ERYTHROCYTE [DISTWIDTH] IN BLOOD BY AUTOMATED COUNT: 12.9 % (ref 11.6–15.1)
GFR SERPL CREATININE-BSD FRML MDRD: 68 ML/MIN/1.73SQ M
GLUCOSE SERPL-MCNC: 118 MG/DL (ref 65–140)
HCT VFR BLD AUTO: 45.3 % (ref 36.5–49.3)
HGB BLD-MCNC: 14.6 G/DL (ref 12–17)
IMM GRANULOCYTES # BLD AUTO: 0.02 THOUSAND/UL (ref 0–0.2)
IMM GRANULOCYTES NFR BLD AUTO: 0 % (ref 0–2)
LYMPHOCYTES # BLD AUTO: 1.59 THOUSANDS/ÂΜL (ref 0.6–4.47)
LYMPHOCYTES NFR BLD AUTO: 25 % (ref 14–44)
MCH RBC QN AUTO: 30.2 PG (ref 26.8–34.3)
MCHC RBC AUTO-ENTMCNC: 32.2 G/DL (ref 31.4–37.4)
MCV RBC AUTO: 94 FL (ref 82–98)
MONOCYTES # BLD AUTO: 0.51 THOUSAND/ÂΜL (ref 0.17–1.22)
MONOCYTES NFR BLD AUTO: 8 % (ref 4–12)
NEUTROPHILS # BLD AUTO: 3.69 THOUSANDS/ÂΜL (ref 1.85–7.62)
NEUTS SEG NFR BLD AUTO: 59 % (ref 43–75)
NRBC BLD AUTO-RTO: 0 /100 WBCS
PLATELET # BLD AUTO: 214 THOUSANDS/UL (ref 149–390)
PMV BLD AUTO: 10.6 FL (ref 8.9–12.7)
POTASSIUM SERPL-SCNC: 3.6 MMOL/L (ref 3.5–5.3)
RBC # BLD AUTO: 4.84 MILLION/UL (ref 3.88–5.62)
SODIUM SERPL-SCNC: 137 MMOL/L (ref 135–147)
WBC # BLD AUTO: 6.3 THOUSAND/UL (ref 4.31–10.16)

## 2025-02-27 PROCEDURE — 99284 EMERGENCY DEPT VISIT MOD MDM: CPT

## 2025-02-27 PROCEDURE — 93005 ELECTROCARDIOGRAM TRACING: CPT

## 2025-02-27 PROCEDURE — 85025 COMPLETE CBC W/AUTO DIFF WBC: CPT | Performed by: EMERGENCY MEDICINE

## 2025-02-27 PROCEDURE — 83520 IMMUNOASSAY QUANT NOS NONAB: CPT | Performed by: EMERGENCY MEDICINE

## 2025-02-27 PROCEDURE — 99285 EMERGENCY DEPT VISIT HI MDM: CPT | Performed by: EMERGENCY MEDICINE

## 2025-02-27 PROCEDURE — 80048 BASIC METABOLIC PNL TOTAL CA: CPT | Performed by: EMERGENCY MEDICINE

## 2025-02-27 PROCEDURE — 36415 COLL VENOUS BLD VENIPUNCTURE: CPT | Performed by: EMERGENCY MEDICINE

## 2025-02-28 ENCOUNTER — TELEPHONE (OUTPATIENT)
Age: 65
End: 2025-02-28

## 2025-02-28 VITALS
SYSTOLIC BLOOD PRESSURE: 111 MMHG | RESPIRATION RATE: 18 BRPM | HEART RATE: 85 BPM | HEIGHT: 71 IN | DIASTOLIC BLOOD PRESSURE: 67 MMHG | BODY MASS INDEX: 32.2 KG/M2 | WEIGHT: 230 LBS | TEMPERATURE: 98 F | OXYGEN SATURATION: 93 %

## 2025-02-28 DIAGNOSIS — G40.209 FOCAL EPILEPSY WITH IMPAIRMENT OF CONSCIOUSNESS (HCC): Primary | ICD-10-CM

## 2025-02-28 LAB
ATRIAL RATE: 97 BPM
LEVETIRACETAM SERPL-MCNC: 60.7 UG/ML (ref 12–46)
P AXIS: 49 DEGREES
PR INTERVAL: 192 MS
QRS AXIS: -2 DEGREES
QRSD INTERVAL: 96 MS
QT INTERVAL: 366 MS
QTC INTERVAL: 464 MS
T WAVE AXIS: 56 DEGREES
VENTRICULAR RATE: 97 BPM

## 2025-02-28 PROCEDURE — 96360 HYDRATION IV INFUSION INIT: CPT

## 2025-02-28 PROCEDURE — 93010 ELECTROCARDIOGRAM REPORT: CPT | Performed by: INTERNAL MEDICINE

## 2025-02-28 RX ORDER — ZONISAMIDE 100 MG/1
CAPSULE ORAL
Qty: 90 CAPSULE | Refills: 5 | Status: SHIPPED | OUTPATIENT
Start: 2025-02-28

## 2025-02-28 RX ADMIN — SODIUM CHLORIDE 500 ML: 0.9 INJECTION, SOLUTION INTRAVENOUS at 00:11

## 2025-02-28 NOTE — TELEPHONE ENCOUNTER
Pt's wife called to inform Dr. Denton that pt was in the ED last night for breakthrough seizure in the setting colitis. Pt had several episodes of diarrhea before bed and wife woke up in middle of the night to find pt seizing in bed during sleep.     Pt went to [facility] ED  (please seen notes). Pt claims compliance with AED and levetiracetam level was elevated at 60.7 This level appears to have been drawn before 500 cc IV bolus of NSS. Prior level was 25.8 on 11/30/24.    Please advise. Thank you.

## 2025-02-28 NOTE — ED PROVIDER NOTES
Time reflects when diagnosis was documented in both MDM as applicable and the Disposition within this note       Time User Action Codes Description Comment    2/28/2025 12:52 AM Adolfo Polanco Add [G40.919] Breakthrough seizure (HCC)           ED Disposition       ED Disposition   Discharge    Condition   Stable    Date/Time   Fri Feb 28, 2025 12:52 AM    Comment   Zain Amador discharge to home/self care.                   Assessment & Plan       Medical Decision Making    64 y.o. male presenting for evaluation after a seizure.  VSS, without complaint at time of evaluation in ED.  Will obtain labs to evaluate for anemia, electrolyte abnormality or ASHU.   Will check levetiracetam level.  Will obtain EKG to evaluate for arrhythmia.  Symptoms more likely seizure in the setting of colitis.  Will observe and repeat evaluation.    Repeat evaluation: VSS, resting comfortably.   Ambulatory in ED without assistance.  Reviewed labs with patient and wife.  Patient follows with neurology as outpatient and is reportedly scheduled for upcoming EEG    I have discussed with the patient our plan to discharge them from the ED and the patient is in agreement with this plan. The patient was provided a written after visit summary with strict RTED precautions.     Discharge Plan: emphasized need for outpatient neurology f/u.  Advised not to drive until seen by neurology    Followup: I have discussed with the patient plan to follow up with neurology. Contact information provided in AVS.    Amount and/or Complexity of Data Reviewed  Labs: ordered.        ED Course as of 02/28/25 0206   Thu Feb 27, 2025   [id number] Procedure Note: EKG  Date/Time: 02/27/25 11:15 PM   Interpreted by: Adolfo Polanco DO  Indications / Diagnosis: seizure  ECG reviewed by me, the ED Provider: yes   The EKG demonstrates:  Rhythm: normal sinus rhythm 97 BPM  Intervals: Normal SC and QT intervals  Axis: Normal axis  QRS/Blocks: Normal QRS  ST Changes: No acute ST/T  "waves changes. No ANAT. No TWI.       Medications   sodium chloride 0.9 % bolus 500 mL (0 mL Intravenous Stopped 2/28/25 0103)       ED Risk Strat Scores                            SBIRT 22yo+      Flowsheet Row Most Recent Value   Initial Alcohol Screen: US AUDIT-C     1. How often do you have a drink containing alcohol? 0 Filed at: 02/27/2025 2312   2. How many drinks containing alcohol do you have on a typical day you are drinking?  0 Filed at: 02/27/2025 2312   3a. Male UNDER 65: How often do you have five or more drinks on one occasion? 0 Filed at: 02/27/2025 2312   Audit-C Score 0 Filed at: 02/27/2025 2312   JOSEE: How many times in the past year have you...    Used an illegal drug or used a prescription medication for non-medical reasons? Never Filed at: 02/27/2025 2312                            History of Present Illness       Chief Complaint   Patient presents with    Seizure - Prior Hx Of       Past Medical History:   Diagnosis Date    Aneurysm of ascending aorta without rupture (HCC)     GERD (gastroesophageal reflux disease)     Memory loss     Seizures (HCC)     Shingles     Sleep apnea       Past Surgical History:   Procedure Laterality Date    CARDIAC PACEMAKER PLACEMENT  06/27/2024    [facility] GUZMAN DR MRI Sure Scan Pacemaker (MR conditional)      History reviewed. No pertinent family history.   Social History     Tobacco Use    Smoking status: Never    Smokeless tobacco: Never   Vaping Use    Vaping status: Never Used   Substance Use Topics    Alcohol use: Not Currently    Drug use: Not Currently     Comment: teenager use of \"pot\":      E-Cigarette/Vaping    E-Cigarette Use Never User       E-Cigarette/Vaping Substances    Nicotine No     THC No     CBD No     Flavoring No     Other No     Unknown No       I have reviewed and agree with the history as documented.     Zain Amador is a 64 y.o. year old male with PMH of seizure disorder on keppra presenting to the [facility] ED for evaluation after a " seizure. Patient reportedly had several episodes of loose, nonloody diarrhea prior to bed. Otherwise in normal state of health. His wife found him shaking with seizure activity while sleeping this evening and called EMS. On EMS arrival patient postictal however had gradual return to baseline mental status. His last seizure was reportedly 5 months ago. Patient has been compliant with previously prescribed keppra. He denies any fall/head trauma. He follows with neurology as outpatient and does not drive. At time of evaluation in ED patient feels tired otherwise denies any complaints. Specifically denies chest pain, dyspnea, N/V or abdominal pain.        History provided by:  Medical records and patient   used: No    Seizure - Prior Hx Of      Review of Systems   Constitutional:  Negative for chills and fever.   Respiratory:  Negative for cough and shortness of breath.    Cardiovascular:  Negative for chest pain.   Gastrointestinal:  Positive for diarrhea. Negative for abdominal pain, nausea and vomiting.   Genitourinary:  Negative for dysuria.   Musculoskeletal:  Negative for neck pain.   Neurological:  Positive for seizures. Negative for dizziness, facial asymmetry, speech difficulty, weakness, numbness and headaches.   All other systems reviewed and are negative.          Objective       ED Triage Vitals   Temperature Pulse Blood Pressure Respirations SpO2 Patient Position - Orthostatic VS   02/27/25 2310 02/27/25 2310 02/27/25 2310 02/27/25 2310 02/27/25 2310 --   98 °F (36.7 °C) 104 143/83 16 96 %       Temp src Heart Rate Source BP Location FiO2 (%) Pain Score    -- 02/28/25 0000 -- -- 02/27/25 2310     Monitor   No Pain      Vitals      Date and Time Temp Pulse SpO2 Resp BP Pain Score FACES Pain Rating User   02/28/25 0030 -- 85 93 % 18 111/67 -- -- TH   02/28/25 0000 -- 93 94 % 18 105/65 -- -- TH 02/27/25 2310 98 °F (36.7 °C) 104 96 % 16 143/83 No Pain -- WM            Physical  "Exam  Vitals and nursing note reviewed.   Constitutional:       Appearance: He is well-developed.   HENT:      Head: Normocephalic and atraumatic.      Mouth/Throat:      Lips: No lesions.      Tongue: Lesions (abrasion right lateral aspect tongue) present.   Cardiovascular:      Rate and Rhythm: Normal rate and regular rhythm.      Heart sounds: No murmur heard.  Pulmonary:      Effort: Pulmonary effort is normal. No respiratory distress.      Breath sounds: Normal breath sounds. No wheezing or rales.   Abdominal:      General: Bowel sounds are normal.      Palpations: Abdomen is soft.      Tenderness: There is no abdominal tenderness. There is no guarding or rebound.   Skin:     General: Skin is warm.      Capillary Refill: Capillary refill takes less than 2 seconds.   Neurological:      Mental Status: He is alert and oriented to person, place, and time.      GCS: GCS eye subscore is 4. GCS verbal subscore is 5. GCS motor subscore is 6.      Cranial Nerves: No cranial nerve deficit, dysarthria or facial asymmetry.      Sensory: Sensation is intact.      Motor: Motor function is intact. No tremor or seizure activity.      Comments: Strength +5/5 in bilateral UE/LE.  No vertical nystagmus noted.  CN III, IV and VI intact.             Results Reviewed       Procedure Component Value Units Date/Time    Levetiracetam level [[id number]]  (Abnormal) Collected: 02/27/25 2321    Lab Status: Final result Specimen: Blood from Arm, Left Updated: 02/28/25 0111     Levetiracetam Lvl 60.7 ug/mL     Narrative:      \"Brivaracetam (Briviact) interferes with measurements of levetiracetam in the ARK Levetiracetam Assay. Patients undergoing a switch in drug therapy involving Keppra and Briviact should not be monitored for levetiracetam using the ARK assay. Serum levels of levetiracetam and or brivaracetam should be confirmed by a valid chromatographic method if there is a possibility these drugs are co-present in circulation.\"    Basic " metabolic panel [[id number]] Collected: 02/27/25 2321    Lab Status: Final result Specimen: Blood from Arm, Left Updated: 02/27/25 2342     Sodium 137 mmol/L      Potassium 3.6 mmol/L      Chloride 104 mmol/L      CO2 22 mmol/L      ANION GAP 11 mmol/L      BUN 22 mg/dL      Creatinine 1.13 mg/dL      Glucose 118 mg/dL      Calcium 9.0 mg/dL      eGFR 68 ml/min/1.73sq m     Narrative:      National Kidney Disease Foundation guidelines for Chronic Kidney Disease (CKD):     Stage 1 with normal or high GFR (GFR > 90 mL/min/1.73 square meters)    Stage 2 Mild CKD (GFR = 60-89 mL/min/1.73 square meters)    Stage 3A Moderate CKD (GFR = 45-59 mL/min/1.73 square meters)    Stage 3B Moderate CKD (GFR = 30-44 mL/min/1.73 square meters)    Stage 4 Severe CKD (GFR = 15-29 mL/min/1.73 square meters)    Stage 5 End Stage CKD (GFR <15 mL/min/1.73 square meters)  Note: GFR calculation is accurate only with a steady state creatinine    CBC and differential [[id number]]  (Abnormal) Collected: 02/27/25 2321    Lab Status: Final result Specimen: Blood from Arm, Left Updated: 02/27/25 2328     WBC 6.30 Thousand/uL      RBC 4.84 Million/uL      Hemoglobin 14.6 g/dL      Hematocrit 45.3 %      MCV 94 fL      MCH 30.2 pg      MCHC 32.2 g/dL      RDW 12.9 %      MPV 10.6 fL      Platelets 214 Thousands/uL      nRBC 0 /100 WBCs      Segmented % 59 %      Immature Grans % 0 %      Lymphocytes % 25 %      Monocytes % 8 %      Eosinophils Relative 7 %      Basophils Relative 1 %      Absolute Neutrophils 3.69 Thousands/µL      Absolute Immature Grans 0.02 Thousand/uL      Absolute Lymphocytes 1.59 Thousands/µL      Absolute Monocytes 0.51 Thousand/µL      Eosinophils Absolute 0.45 Thousand/µL      Basophils Absolute 0.04 Thousands/µL             No orders to display       Procedures    ED Medication and Procedure Management   Prior to Admission Medications   Prescriptions Last Dose Informant Patient Reported? Taking?   Misc Natural Products  (ELDERBERRY ZINC/VIT C/IMMUNE MT)  Self Yes No   Sig: Take 1 capsule by mouth daily   NON FORMULARY  Self Yes No   Sig: Take 1 capsule by mouth daily Life Extension Ocular support with saffron   cetirizine (ZyrTEC) 10 mg tablet  Self Yes No   Sig: Take 10 mg by mouth daily   famotidine (PEPCID) 20 mg tablet  Self Yes No   Sig: Take 20 mg by mouth daily   levetiracetam 750 MG TB24 extended-release tablet   No No   Sig: Take 4 tablets (3,000 mg total) by mouth daily at bedtime   multivitamin (THERAGRAN) TABS  Self Yes No   Sig: Take 1 tablet by mouth daily   pantoprazole (PROTONIX) 20 mg tablet  Self Yes No   Sig: TAKE 1 TABLET BY MOUTH BEFORE BREAKFAST (30 MINUTES)   vitamin B-12 (VITAMIN B-12) 1,000 mcg tablet   Yes No   Sig: Take 1,000 mcg by mouth daily      Facility-Administered Medications: None     Discharge Medication List as of 2/28/2025 12:53 AM        CONTINUE these medications which have NOT CHANGED    Details   cetirizine (ZyrTEC) 10 mg tablet Take 10 mg by mouth daily, Historical Med      famotidine (PEPCID) 20 mg tablet Take 20 mg by mouth daily, Historical Med      levetiracetam 750 MG TB24 extended-release tablet Take 4 tablets (3,000 mg total) by mouth daily at bedtime, Starting Thu 1/2/2025, Normal      Misc Natural Products (ELDERBERRY ZINC/VIT C/IMMUNE MT) Take 1 capsule by mouth daily, Historical Med      multivitamin (THERAGRAN) TABS Take 1 tablet by mouth daily, Historical Med      NON FORMULARY Take 1 capsule by mouth daily Life Extension Ocular support with saffron, Historical Med      pantoprazole (PROTONIX) 20 mg tablet TAKE 1 TABLET BY MOUTH BEFORE BREAKFAST (30 MINUTES), Historical Med      vitamin B-12 (VITAMIN B-12) 1,000 mcg tablet Take 1,000 mcg by mouth daily, Historical Med           No discharge procedures on file.  ED SEPSIS DOCUMENTATION   Time reflects when diagnosis was documented in both MDM as applicable and the Disposition within this note       Time User Action Codes  Description Comment    2/28/2025 12:52 AM Adolfo Polanco Add [G40.919] Breakthrough seizure (HCC)                  Adolfo Polanco, DO  02/28/25 0206

## 2025-02-28 NOTE — DISCHARGE INSTRUCTIONS
You have been seen for evaluation after a seizure. Please stay hydrated and continue your home medication regimen. Return to the emergency department if you develop worsening seizures, fevers or any other symptoms of concern. Please follow up with your neurologist by calling the number provided.

## 2025-02-28 NOTE — TELEPHONE ENCOUNTER
Lavonne called stated she had to take to the ER last night for a bad seizure. Lavonne asking if EEG or meds needs to be changed.    Please Lavonne back at [phone]

## 2025-02-28 NOTE — TELEPHONE ENCOUNTER
Phone call to patient regarding Dr. Denton's message below. Reviewed same. Patient was agreeable to starting Zonisamide.      Dr. Denton, please see above. Thank you!

## 2025-02-28 NOTE — TELEPHONE ENCOUNTER
"I'm not sure if \"colitis\" was necessarily a trigger for the seizure, or if the seizure was going to happen regardless of the diarrhea.  His levetiracetam level is likely a peak level based on having taken the medication before bedtime, plus, if he did not absorb the medication due to diarrhea the level should be low and not high.    He probably is still having sleep related seizures and will need to start another antiseizure medication.    We could start him on zonisamide 100mg capsule one capsule at bedtime for 2 weeks then two capsules at bedtime for 2 weeks, then go to 3 capsules at bedtime.  Then will need blood work in 6-8 weeks to monitor medication level and BMP.  I reviewed side effects of zonisamide, which include, but not limited to, drug rash, Perfecto Mike syndrome, kidney stones, metabolic acidosis, renal insufficiency, mood swings, irritability, suicidal ideation, medication interactions, ataxia, incoordination, cognitive impairment, weight loss, and lack of appetite, feverish    "

## 2025-03-04 ENCOUNTER — TELEPHONE (OUTPATIENT)
Age: 65
End: 2025-03-04

## 2025-03-04 NOTE — LETTER
March 5, 2025     Patient: Zain Amador  YOB: 1960      To Whom it May Concern:    Zain Amador is under my professional care regarding his epilepsy. He had a breakthrough seizure on 2/27/2025 that required an emergency department visit.  After the seizure he continued to have muscle soreness and required days to recover.  Please excuse his absence from 2/27 to 3/4/2025.  He may return to work on 3/5/2025.    If you have any questions or concerns, please don't hesitate to call.         Sincerely,          Leah Denton MD        CC: No Recipients

## 2025-03-04 NOTE — TELEPHONE ENCOUNTER
Patient called in stating that he had gotten a seizure this past Thursday and went to the Hospital and is now feeling better.    He is requesting a note from Dr. Bertin Hammer that he is ok to return to work now .    Job information :    [address], [place] [address]  P# [phone].

## 2025-03-05 NOTE — TELEPHONE ENCOUNTER
Spoke to patient. Missed Friday, Monday and Tuesday due to severe seizure. Bloody mouth, sore body. Did go back to work today (3/5/2025).     Would like letter sent to [employer]. He will also obtain fax number for employer.

## 2025-03-05 NOTE — TELEPHONE ENCOUNTER
Fax machine is not working at the moment. He asked his work for an email that it can be sent to. He was give: [email or web address]

## 2025-03-19 ENCOUNTER — TELEPHONE (OUTPATIENT)
Dept: NEUROLOGY | Facility: CLINIC | Age: 65
End: 2025-03-19

## 2025-04-08 ENCOUNTER — TELEPHONE (OUTPATIENT)
Age: 65
End: 2025-04-08

## 2025-04-08 NOTE — TELEPHONE ENCOUNTER
Reached out to pt in regards to Medication Management wait list maintenance & to gather pt preferences. LVM for pt to contact intake dept.     Outside resources can be offered.    No available appts at this time.    First attempt

## 2025-04-14 ENCOUNTER — HOSPITAL ENCOUNTER (OUTPATIENT)
Dept: NEUROLOGY | Facility: CLINIC | Age: 65
Discharge: HOME | End: 2025-04-14

## 2025-04-14 DIAGNOSIS — G40.209 FOCAL EPILEPSY WITH IMPAIRMENT OF CONSCIOUSNESS (HCC): ICD-10-CM

## 2025-04-15 ENCOUNTER — HOSPITAL ENCOUNTER (OUTPATIENT)
Dept: NEUROLOGY | Facility: CLINIC | Age: 65
Discharge: HOME | End: 2025-04-15
Payer: COMMERCIAL

## 2025-04-15 DIAGNOSIS — G40.209 FOCAL EPILEPSY WITH IMPAIRMENT OF CONSCIOUSNESS (HCC): ICD-10-CM

## 2025-04-15 PROCEDURE — 95708 EEG WO VID EA 12-26HR UNMNTR: CPT

## 2025-04-16 ENCOUNTER — HOSPITAL ENCOUNTER (OUTPATIENT)
Dept: NEUROLOGY | Facility: CLINIC | Age: 65
Discharge: HOME | End: 2025-04-16
Payer: COMMERCIAL

## 2025-04-16 DIAGNOSIS — G40.209 FOCAL EPILEPSY WITH IMPAIRMENT OF CONSCIOUSNESS (HCC): ICD-10-CM

## 2025-04-16 PROCEDURE — 95708 EEG WO VID EA 12-26HR UNMNTR: CPT

## 2025-04-17 PROCEDURE — 95719 EEG PHYS/QHP EA INCR W/O VID: CPT | Performed by: PSYCHIATRY & NEUROLOGY

## 2025-04-28 ENCOUNTER — TELEPHONE (OUTPATIENT)
Age: 65
End: 2025-04-28

## 2025-04-28 NOTE — TELEPHONE ENCOUNTER
Called the patient back gave the Dr Denton message. Also faxed the lab slip to [employer] [employer] If patient calls back please convey my message. Mauricio.

## 2025-04-28 NOTE — TELEPHONE ENCOUNTER
Patient called stating his next appt is on 5/7 at 4pm with Dr Denton. Patient is asking if he needs to get blood work done prior this appt?      Per chart, patient does have orders for blood work which are dated 2/28/25. Does patient still need these orders completed prior to his next appt or does Dr Denton want to submit different orders?      Please assist. Thank you.

## 2025-04-29 NOTE — TELEPHONE ENCOUNTER
Pt called and would like his blood work script faxed to a different [facility]. He said he has an appointment at [facility] tomorrow (4/30/25 at 10:10 am) for blood work.      [facility] Fax # ([phone])      Please assist,    Thank you,    Mignon

## 2025-04-30 ENCOUNTER — TELEPHONE (OUTPATIENT)
Age: 65
End: 2025-04-30

## 2025-04-30 NOTE — TELEPHONE ENCOUNTER
"Prefecto, Vuze, calling to clarify lab orders he needs to draw as lab slip was \"cut off' has BMP, zonisamide level however can not see third test.    Per chart review, levetiracetam level also ordered;  advised Perfecto of same; no further questions/concerns.  "

## 2025-05-01 ENCOUNTER — RESULTS FOLLOW-UP (OUTPATIENT)
Dept: OTHER | Facility: HOSPITAL | Age: 65
End: 2025-05-01

## 2025-05-07 ENCOUNTER — OFFICE VISIT (OUTPATIENT)
Dept: NEUROLOGY | Facility: CLINIC | Age: 65
End: 2025-05-07
Payer: COMMERCIAL

## 2025-05-07 VITALS
BODY MASS INDEX: 33.74 KG/M2 | SYSTOLIC BLOOD PRESSURE: 124 MMHG | OXYGEN SATURATION: 98 % | DIASTOLIC BLOOD PRESSURE: 74 MMHG | RESPIRATION RATE: 14 BRPM | HEIGHT: 71 IN | TEMPERATURE: 97.8 F | WEIGHT: 241 LBS | HEART RATE: 73 BPM

## 2025-05-07 DIAGNOSIS — G31.84 MILD COGNITIVE IMPAIRMENT: ICD-10-CM

## 2025-05-07 DIAGNOSIS — G47.33 OBSTRUCTIVE SLEEP APNEA SYNDROME: ICD-10-CM

## 2025-05-07 DIAGNOSIS — G47.00 INSOMNIA, UNSPECIFIED TYPE: ICD-10-CM

## 2025-05-07 DIAGNOSIS — G40.209 FOCAL EPILEPSY WITH IMPAIRMENT OF CONSCIOUSNESS (HCC): Primary | ICD-10-CM

## 2025-05-07 PROBLEM — I71.21 ANEURYSM OF ASCENDING AORTA WITHOUT RUPTURE (HCC): Status: ACTIVE | Noted: 2024-08-21

## 2025-05-07 PROCEDURE — 99215 OFFICE O/P EST HI 40 MIN: CPT | Performed by: PSYCHIATRY & NEUROLOGY

## 2025-05-07 RX ORDER — LEVETIRACETAM 750 MG/1
2250 TABLET, FILM COATED, EXTENDED RELEASE ORAL
Qty: 270 TABLET | Refills: 1 | Status: SHIPPED | OUTPATIENT
Start: 2025-05-07

## 2025-05-07 RX ORDER — ZONISAMIDE 100 MG/1
300 CAPSULE ORAL
Qty: 90 CAPSULE | Refills: 5 | Status: SHIPPED | OUTPATIENT
Start: 2025-05-07
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
## 2025-05-07 NOTE — ASSESSMENT & PLAN NOTE
continue to take B12 supplements  - consider referral to speech therapy to work on word finding difficulties, memory skills.  - call the office when you want the referral to speech therapy.
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
## 2025-05-07 NOTE — ASSESSMENT & PLAN NOTE
referral to sleep medicine for evaluation for CBT-I or if his CPAP settings need to be adjusted.  Orders:    Ambulatory Referral to Sleep Medicine; Future

## 2025-05-07 NOTE — TELEPHONE ENCOUNTER
Reached out to pt in regards to Medication Management  wait list maintenance & to gather pt preferences. LVM for pt to contact intake dept.     Outside resources can be offered.    No available appts at this time.    Second attempt. Pt removed from wait list.

## 2025-05-07 NOTE — ASSESSMENT & PLAN NOTE
Right temporal seizures during sleep  - continue with Zonisamide 100mg caps, take three caps at bedtime  - reduce Levetiracetam ER 750mg tab to three tabs at bedtime  - call the office in 1 month to report if there have been no seizures, then will reduce Levetiracetam by another tab, continue to call monthly until off of Levetiracetam  - if there is a breakthrough seizure will increase Zonisamide as tolerated, will need to monitor creatinine level and kidney function  - if there are worsening kidney function, will need to transition to another antiseizure medication such as lamotrigine (Lamictal) or lacosamide (Vimpat).  Orders:    levetiracetam 750 MG TB24 extended-release tablet; Take 3 tablets (2,250 mg total) by mouth daily at bedtime    zonisamide (ZONEGRAN) 100 mg capsule; Take 3 capsules (300 mg total) by mouth daily at bedtime

## 2025-05-07 NOTE — PATIENT INSTRUCTIONS
Right temporal seizures during sleep  - continue with Zonisamide 100mg caps, take three caps at bedtime  - reduce Levetiracetam ER 750mg tab to three tabs at bedtime  - call the office in 1 month to report if there have been no seizures, then will reduce Levetiracetam by another tab, continue to call monthly until off of Levetiracetam  - if there is a breakthrough seizure will increase Zonisamide as tolerated, will need to monitor creatinine level and kidney function  - if there are worsening kidney function, will need to transition to another antiseizure medication such as lamotrigine (Lamictal) or lacosamide (Vimpat).    Mild Cognitive impairment with memory difficulty  - continue to take B12 supplements  - consider referral to speech therapy to work on word finding difficulties, memory skills.  - call the office when you want the referral to speech therapy.    Insomnia   - referral to sleep medicine for evaluation for CBT-I or if his CPAP settings need to be adjusted.    Follow-up in 4 months    Seizure protocol  If he has a seizure that last less than 5 minutes then allow him to recover at home; just get him to the ground for safety.  Call 911 if the following conditions apply  - unwitnessed onset of seizure and there is a potential head injury that needs to be evaluated  - patient stops breathing or turns blue during a seizure  - if the seizure is longer than 5 minutes  - if after the seizure ends and he does not show recovery over the course of 30 minutes.  - if there are multiple seizures in 24 hours  - if the patient refused taking him medications for the past 24 hours  - if there is physical injury from the seizure

## 2025-05-27 ENCOUNTER — TELEPHONE (OUTPATIENT)
Age: 65
End: 2025-05-27

## 2025-05-27 DIAGNOSIS — G40.209 FOCAL EPILEPSY WITH IMPAIRMENT OF CONSCIOUSNESS (HCC): Primary | ICD-10-CM

## 2025-05-27 NOTE — TELEPHONE ENCOUNTER
REASON FOR CONVERSATION: Travel by Plane and Home Alone x 10 days    Recd call from pt requesting information with regards to traveling by plane in August and being at home alone x10 days in October while spouse it out of town.     Pt is taking his AED medications  on time/has not missed a dose. Denies any recent seizure activity.     AED Medications:  --Zonisamide 300 mg HS  --Levetiracetam 2,250 mg HS       CARE ADVICE PROVIDED: Advised to take extra AED medication in case of delayed travel time, having emergency medication on hand, informing flight crew of seizure activity/knowing airline policies, knowing where emergency services are located while in the city where pt is staying.     Advised pt to make sure there is an emergency contact and/or neighbor available to check on pt during the 10 days, make sure pt has enough seizure medication on hand/taking the medication on time.     PRACTICE FOLLOW-UP: Please review and advise if pt is safe to travel by plane in August and if pt can be alone in October x10 days if spouse is away. Requiring call back to pt.

## 2025-05-28 NOTE — TELEPHONE ENCOUNTER
Follow up call to patient regarding Dr. Denton's message below. Reviewed same. Sent additional travel tips to patient's MyChart.       Dr Denton,  Patient reported he has had focal seizures during sleep that have lasted upwards of 10 minutes. Would a rescue medication be appropriate?

## 2025-05-28 NOTE — TELEPHONE ENCOUNTER
With regards to flying.  As long as there has been no seizure in the last 24 hours before the flight, I have no objection to him flying.  Agree that he needs to make sure he has enough medications for the trip and to take medications in original pill bottles.      With regards to being alone for 10 days, that is often a personal choice.  As there were some recent seizures, I would say if someone can check up on him every day or if someone can stay with him for a couple of those days while she is away it may help monitor when he has a seizure.

## 2025-05-30 NOTE — TELEPHONE ENCOUNTER
I can prescribe a nasal spray with midazolam (nayzilam) as a rescue medication if he was to have a seizure for more than 5 minutes.  However, this medication is usually administered by someone other than the patient.  So someone else has to be able to time the seizure and administer.    For continuous seizure activity lasting more than 5 minutes or cluster of seizures over the course of 30 minutes, give one nasal spray of nasal midazolam (Nayzilam) 5mg/0.1mL to one nostril, may repeat in 10 minutes if seizure activity continues with one nasal spray to other nostril.  Go to [email or web address] for video instructions.     If they are interested in having this (especially for traveling), I can write a prescription.

## 2025-06-02 RX ORDER — MIDAZOLAM 5 MG/.1ML
SPRAY NASAL
Qty: 2 EACH | Refills: 2 | Status: SHIPPED | OUTPATIENT
Start: 2025-06-02

## 2025-06-02 NOTE — TELEPHONE ENCOUNTER
Follow up call to patient regarding midazolam (nayzilam.) Reviewed administration directions. Patient was interested in having a prescription sent to his pharmacy. Advised many insurance companies frequently require a prior authorization. If this is the case, we will be notified by patient's pharmacy and we will process the request on behalf of the patient. Patient voiced clear understanding.    Note: For clarifacation-   Patient will be traveling with his spouse Lavonne in August, 2025    Patient had questions about staying home alone for ten days during the month of October, 2025    Dr. Denton,  Please send a prescription for midazolam (nayzilam,) to patient's pharmacy.  Thank you!

## 2025-06-05 ENCOUNTER — TELEPHONE (OUTPATIENT)
Dept: NEUROLOGY | Facility: CLINIC | Age: 65
End: 2025-06-05

## 2025-06-09 ENCOUNTER — TELEPHONE (OUTPATIENT)
Age: 65
End: 2025-06-09

## 2025-06-09 DIAGNOSIS — G40.209 FOCAL EPILEPSY WITH IMPAIRMENT OF CONSCIOUSNESS (HCC): ICD-10-CM

## 2025-06-09 NOTE — TELEPHONE ENCOUNTER
Good morning,     Pt' spouse Lavonne on consent form has called back as requested by .    She stated patient is doing well after 1 month of medication dosage changed and is asking if  now would like to again change the dosage from 3 to 2 tablets a day     Regarding medication:    levetiracetam 750 MG TB24 extended-release tablet      Best call back number [phone].      Thank you in advance,     Daphne

## 2025-06-10 RX ORDER — LEVETIRACETAM 750 MG/1
1500 TABLET, FILM COATED, EXTENDED RELEASE ORAL
Qty: 60 TABLET | Refills: 1 | Status: SHIPPED | OUTPATIENT
Start: 2025-06-10

## 2025-06-10 NOTE — TELEPHONE ENCOUNTER
Attempted to complete Nayzilam PA on CMM  Key: [id number]  Received message-  To initiate an authorization request for this medication, please contact [employer] ([employer]) at [phone].     Called [employer] at [phone] and spoke to michael.   States that Med not covered on formulary.      States that covered alternatives are  Diazepam rectal gel, Clonazepam,Depakote, Lamotrigine, LEVE, topiramate    Advised that most of these meds are to prevent seizures and nayzilam is to use when pt has a seizure  He states that we can complete a formulary exception form     Formulary exception form will be faxed to .   Can take up to 24 hr to receive fax    Awaiting Formulary exception form

## 2025-06-10 NOTE — TELEPHONE ENCOUNTER
Yes correct, reduce levetiracetam ER 750mg tab to two tabs at bedtime for the next month.  Levetiracetam prescription updated.

## 2025-06-10 NOTE — TELEPHONE ENCOUNTER
"Follow up call to patient's spouse Lavonne regarding message below.     Per Dr. Denton's office notes on 25, \"call the office in 1 month to report if there have been no seizures, then will reduce Levetiracetam by another tab, continue to call monthly until off of Levetiracetam.\"     Lavonne reports patient has been doing well over the last month with no break through seizures since decreasing Levetiracetam 750 mg ER from 3 tabs to 2 tabs HS. Advised to  Levetiracetam 750 mg ER to 2 tabs at bedtime and call back in one months to update Dr. Denton how patient is doing. Lavonne voiced clear understanding.     Dr. Denton,  RIKY.  Thank you!  "

## 2025-07-07 ENCOUNTER — TELEPHONE (OUTPATIENT)
Age: 65
End: 2025-07-07

## 2025-07-07 DIAGNOSIS — G40.209 FOCAL EPILEPSY WITH IMPAIRMENT OF CONSCIOUSNESS (HCC): ICD-10-CM

## 2025-07-07 RX ORDER — LEVETIRACETAM 750 MG/1
750 TABLET, FILM COATED, EXTENDED RELEASE ORAL
Qty: 28 TABLET | Refills: 0 | Status: SHIPPED | OUTPATIENT
Start: 2025-07-07 | End: 2025-08-04

## 2025-07-07 NOTE — TELEPHONE ENCOUNTER
Call transferred from [facility] corinne    Spoke to pt.  it has been a month since he decreased dose of LEVE and he is asking if he can decrease by another pill    Per office note from 5/7-  call the office in 1 month to report if there have been no seizures, then will reduce Levetiracetam by another tab, continue to call monthly until off of Levetiracetam     Please also see encounter from 6/9 where dose was decreased to 2 tabs hs    currrently taking LEVE ER 750mg 2 tabs HS  no seizures     Please advise  [phone] to leave detailed message

## 2025-07-07 NOTE — TELEPHONE ENCOUNTER
Yes he may reduce Levetiracetam to one tab at bedtime for 4 weeks, then stop Levetiracetam, if no seizures.      He will continue with Zonisamide 300mg at bedtime.

## 2025-07-08 NOTE — TELEPHONE ENCOUNTER
Patient calling in to follow up on with [facility] about medication changes that were discussed yesterday.    Please call Patient to advise.    Patient would also like to advise that he was able to get a coupon for his Nasal spray which was denied by his insurance, he stated he only had to pay $20.

## 2025-07-26 ENCOUNTER — NURSE TRIAGE (OUTPATIENT)
Dept: OTHER | Facility: OTHER | Age: 65
End: 2025-07-26

## 2025-07-26 NOTE — TELEPHONE ENCOUNTER
"REASON FOR CONVERSATION: Cough and Medication Problem    SYMPTOMS: Dry cough that developed yesterday which affects sleep. Denies any other URI symptoms.    OTHER HEALTH INFORMATION: Pt inquiring what OTC cough medicine is safest to take with anti-seizure prescriptions.     PROTOCOL DISPOSITION:  Now (overriding Call PCP Now),  Now (overriding Call PCP Now)    On call provider contacted regarding pt question.     CARE ADVICE PROVIDED: Per on call provider, dextromethorphan should be fine to take to help with cough.     RN also advised pt to call back with any new or worsening symptoms. Pt verbalized understanding.     PRACTICE FOLLOW-UP: None at this time.       Reason for Disposition   [1] Caller has URGENT medicine question about med that PCP or specialist prescribed AND [2] triager unable to answer question    Answer Assessment - Initial Assessment Questions  1. NAME of MEDICINE: \"What medicine(s) are you calling about?\"        Inquiring what OTC he can take for dry cough that is safe to take with prescribed anti-seizure medications.     2. QUESTION: \"What is your question?\" (e.g., double dose of medicine, side effect)            Inquiring what OTC he can take for dry cough that is safe to take with prescribed anti-seizure medications.     3. PRESCRIBER: \"Who prescribed the medicine?\" Reason: if prescribed by specialist, call should be referred to that group.        N/A .    4. SYMPTOMS: \"Do you have any symptoms?\" If Yes, ask: \"What symptoms are you having?\"  \"How bad are the symptoms (e.g., mild, moderate, severe)        Dry cough that started last night (no fever, runny nose, or other URI symptoms). Dry cough affecting sleep (uses cpap machine).    Protocols used: Medication Question Call-Adult-AH    "

## 2025-08-11 ENCOUNTER — APPOINTMENT (OUTPATIENT)
Age: 65
End: 2025-08-11
Payer: MEDICARE